# Patient Record
Sex: FEMALE | Race: WHITE | NOT HISPANIC OR LATINO | Employment: OTHER | URBAN - METROPOLITAN AREA
[De-identification: names, ages, dates, MRNs, and addresses within clinical notes are randomized per-mention and may not be internally consistent; named-entity substitution may affect disease eponyms.]

---

## 2017-03-27 ENCOUNTER — HOSPITAL ENCOUNTER (OUTPATIENT)
Dept: MAMMOGRAPHY | Facility: HOSPITAL | Age: 72
Discharge: HOME | End: 2017-03-27
Attending: INTERNAL MEDICINE
Payer: MEDICARE

## 2017-03-27 DIAGNOSIS — Z12.31 ENCOUNTER FOR SCREENING MAMMOGRAM FOR MALIGNANT NEOPLASM OF BREAST: ICD-10-CM

## 2017-03-27 PROCEDURE — G0202 SCR MAMMO BI INCL CAD: HCPCS

## 2017-04-12 ENCOUNTER — APPOINTMENT (OUTPATIENT)
Dept: LAB | Facility: CLINIC | Age: 72
End: 2017-04-12
Payer: MEDICARE

## 2017-04-12 ENCOUNTER — TRANSCRIBE ORDERS (OUTPATIENT)
Dept: LAB | Facility: CLINIC | Age: 72
End: 2017-04-12

## 2017-04-12 DIAGNOSIS — Z79.01 LONG TERM (CURRENT) USE OF ANTICOAGULANTS: ICD-10-CM

## 2017-04-12 DIAGNOSIS — I10 UNSPECIFIED ESSENTIAL HYPERTENSION: ICD-10-CM

## 2017-04-12 DIAGNOSIS — E78.00 PURE HYPERCHOLESTEROLEMIA: ICD-10-CM

## 2017-04-12 DIAGNOSIS — E78.00 PURE HYPERCHOLESTEROLEMIA: Primary | ICD-10-CM

## 2017-04-12 LAB
ALBUMIN SERPL BCP-MCNC: 3.6 G/DL (ref 3.5–5)
ALP SERPL-CCNC: 86 U/L (ref 46–116)
ALT SERPL W P-5'-P-CCNC: 32 U/L (ref 12–78)
ANION GAP SERPL CALCULATED.3IONS-SCNC: 8 MMOL/L (ref 4–13)
AST SERPL W P-5'-P-CCNC: 24 U/L (ref 5–45)
BILIRUB DIRECT SERPL-MCNC: 0.26 MG/DL (ref 0–0.2)
BILIRUB SERPL-MCNC: 1 MG/DL (ref 0.2–1)
BUN SERPL-MCNC: 20 MG/DL (ref 5–25)
CALCIUM SERPL-MCNC: 9 MG/DL (ref 8.3–10.1)
CHLORIDE SERPL-SCNC: 103 MMOL/L (ref 100–108)
CHOLEST SERPL-MCNC: 196 MG/DL (ref 50–200)
CK SERPL-CCNC: 51 U/L (ref 26–192)
CO2 SERPL-SCNC: 31 MMOL/L (ref 21–32)
CREAT SERPL-MCNC: 1.02 MG/DL (ref 0.6–1.3)
ERYTHROCYTE [DISTWIDTH] IN BLOOD BY AUTOMATED COUNT: 12.8 % (ref 11.6–15.1)
GFR SERPL CREATININE-BSD FRML MDRD: 53.1 ML/MIN/1.73SQ M
GLUCOSE P FAST SERPL-MCNC: 96 MG/DL (ref 65–99)
HCT VFR BLD AUTO: 39.3 % (ref 34.8–46.1)
HDLC SERPL-MCNC: 121 MG/DL (ref 40–60)
HGB BLD-MCNC: 12.7 G/DL (ref 11.5–15.4)
LDLC SERPL CALC-MCNC: 68 MG/DL (ref 0–100)
MCH RBC QN AUTO: 30.1 PG (ref 26.8–34.3)
MCHC RBC AUTO-ENTMCNC: 32.3 G/DL (ref 31.4–37.4)
MCV RBC AUTO: 93 FL (ref 82–98)
PLATELET # BLD AUTO: 273 THOUSANDS/UL (ref 149–390)
PMV BLD AUTO: 9.6 FL (ref 8.9–12.7)
POTASSIUM SERPL-SCNC: 4 MMOL/L (ref 3.5–5.3)
PROT SERPL-MCNC: 7.3 G/DL (ref 6.4–8.2)
RBC # BLD AUTO: 4.22 MILLION/UL (ref 3.81–5.12)
SODIUM SERPL-SCNC: 142 MMOL/L (ref 136–145)
TRIGL SERPL-MCNC: 35 MG/DL
WBC # BLD AUTO: 5.91 THOUSAND/UL (ref 4.31–10.16)

## 2017-04-12 PROCEDURE — 85027 COMPLETE CBC AUTOMATED: CPT

## 2017-04-12 PROCEDURE — 36415 COLL VENOUS BLD VENIPUNCTURE: CPT

## 2017-04-12 PROCEDURE — 80048 BASIC METABOLIC PNL TOTAL CA: CPT

## 2017-04-12 PROCEDURE — 82550 ASSAY OF CK (CPK): CPT

## 2017-04-12 PROCEDURE — 80076 HEPATIC FUNCTION PANEL: CPT

## 2017-04-12 PROCEDURE — 80061 LIPID PANEL: CPT

## 2017-06-14 ENCOUNTER — HOSPITAL ENCOUNTER (OUTPATIENT)
Dept: ULTRASOUND IMAGING | Facility: HOSPITAL | Age: 72
Discharge: HOME | End: 2017-06-14
Payer: MEDICARE

## 2017-06-14 DIAGNOSIS — E04.1 NONTOXIC UNINODULAR GOITER: ICD-10-CM

## 2017-06-14 PROCEDURE — 76536 US EXAM OF HEAD AND NECK: CPT

## 2017-10-26 ENCOUNTER — TRANSCRIBE ORDERS (OUTPATIENT)
Dept: LAB | Facility: CLINIC | Age: 72
End: 2017-10-26

## 2017-10-26 ENCOUNTER — APPOINTMENT (OUTPATIENT)
Dept: LAB | Facility: CLINIC | Age: 72
End: 2017-10-26
Payer: MEDICARE

## 2017-10-26 DIAGNOSIS — I10 ESSENTIAL HYPERTENSION, MALIGNANT: ICD-10-CM

## 2017-10-26 DIAGNOSIS — R00.2 PALPITATIONS: ICD-10-CM

## 2017-10-26 DIAGNOSIS — I10 ESSENTIAL HYPERTENSION, MALIGNANT: Primary | ICD-10-CM

## 2017-10-26 LAB
ALBUMIN SERPL BCP-MCNC: 3.5 G/DL (ref 3.5–5)
ALP SERPL-CCNC: 100 U/L (ref 46–116)
ALT SERPL W P-5'-P-CCNC: 26 U/L (ref 12–78)
ANION GAP SERPL CALCULATED.3IONS-SCNC: 7 MMOL/L (ref 4–13)
AST SERPL W P-5'-P-CCNC: 21 U/L (ref 5–45)
BILIRUB DIRECT SERPL-MCNC: 0.19 MG/DL (ref 0–0.2)
BILIRUB SERPL-MCNC: 0.7 MG/DL (ref 0.2–1)
BUN SERPL-MCNC: 20 MG/DL (ref 5–25)
CALCIUM SERPL-MCNC: 9.3 MG/DL (ref 8.3–10.1)
CHLORIDE SERPL-SCNC: 104 MMOL/L (ref 100–108)
CHOLEST SERPL-MCNC: 204 MG/DL (ref 50–200)
CK SERPL-CCNC: 37 U/L (ref 26–192)
CO2 SERPL-SCNC: 32 MMOL/L (ref 21–32)
CREAT SERPL-MCNC: 0.89 MG/DL (ref 0.6–1.3)
GFR SERPL CREATININE-BSD FRML MDRD: 64 ML/MIN/1.73SQ M
GLUCOSE P FAST SERPL-MCNC: 100 MG/DL (ref 65–99)
HDLC SERPL-MCNC: 124 MG/DL (ref 40–60)
LDLC SERPL CALC-MCNC: 72 MG/DL (ref 0–100)
POTASSIUM SERPL-SCNC: 4.3 MMOL/L (ref 3.5–5.3)
PROT SERPL-MCNC: 7.2 G/DL (ref 6.4–8.2)
SODIUM SERPL-SCNC: 143 MMOL/L (ref 136–145)
TRIGL SERPL-MCNC: 40 MG/DL

## 2017-10-26 PROCEDURE — 80048 BASIC METABOLIC PNL TOTAL CA: CPT

## 2017-10-26 PROCEDURE — 80076 HEPATIC FUNCTION PANEL: CPT

## 2017-10-26 PROCEDURE — 36415 COLL VENOUS BLD VENIPUNCTURE: CPT

## 2017-10-26 PROCEDURE — 82550 ASSAY OF CK (CPK): CPT

## 2017-10-26 PROCEDURE — 80061 LIPID PANEL: CPT

## 2017-11-28 ENCOUNTER — GENERIC CONVERSION - ENCOUNTER (OUTPATIENT)
Dept: OTHER | Facility: OTHER | Age: 72
End: 2017-11-28

## 2017-11-28 ENCOUNTER — TRANSCRIBE ORDERS (OUTPATIENT)
Dept: ADMINISTRATIVE | Facility: HOSPITAL | Age: 72
End: 2017-11-28

## 2017-11-28 DIAGNOSIS — Z12.39 SCREENING BREAST EXAMINATION: Primary | ICD-10-CM

## 2017-11-30 ENCOUNTER — ALLSCRIPTS OFFICE VISIT (OUTPATIENT)
Dept: OTHER | Facility: OTHER | Age: 72
End: 2017-11-30

## 2017-12-05 LAB
HPV 18 (HISTORICAL): NOT DETECTED
HPV HIGH RISK 16/18 (HISTORICAL): NOT DETECTED
HPV16 (HISTORICAL): NOT DETECTED
PAP (HISTORICAL): NORMAL

## 2017-12-05 NOTE — PROGRESS NOTES
Assessment    1  Encounter for gynecological examination with Papanicolaou smear of cervix (V72 31)   (Z01 419)    Discussion/Summary  health maintenance visit Currently, she eats a healthy diet  the risks and benefits of cervical cancer screening were discussed cervical cancer screening is current Pap test was done today Breast cancer screening: the risks and benefits of breast cancer screening were discussed and mammogram is current  Colorectal cancer screening: the risks and benefits of colorectal cancer screening were discussed and colorectal cancer screening is current  Osteoporosis screening: the risks and benefits of osteoporosis screening were discussed and bone mineral density testing is current  Normal APE   continue f/u with oncology and PCP  The patient has the current Goals: Routine exam  The patent has the current Barriers: None  Patient is able to Self-Care  The treatment plan was reviewed with the patient/guardian  The patient/guardian understands and agrees with the treatment plan     Self Referrals: No      Chief Complaint  annual Gyn Exam      History of Present Illness  HPI: Here for annual gyn exam - overall well -no vaginal bleeding or discharge - no menopausal complaints - bowels and bladder normal - up to date with colonoscopy, dexascan, blood work and due for mammogram - close f/u with oncology and PCP   GYN , Adult Female [facility]: The patient is being seen for a health maintenance and gynecology evaluation  The last health maintenance visit was 11/12/2015  General Health: The patient's health since the last visit is described as good  She denies vision problems  She denies hearing loss  Immunizations status: up to date  Lifestyle:  She consumes a diverse and healthy diet  She does not have any weight concerns  She exercises regularly  She does not use tobacco  She consumes alcohol  She denies drug use     Reproductive health: the patient is postmenopausal   she reports no menstrual problems  Menstrual history:  age at menarche was 3  LMP: the patient is unsure of the date of her LMP  she uses no contraception  Screening: cancer screening reviewed and current  metabolic screening reviewed and current  risk screening reviewed and current  Review of Systems    Constitutional: No fever, no chills, feels well, no tiredness, no recent weight gain or loss  Cardiovascular: lower extremity edema, but the heart rate was not slow, no chest pain, no intermittent leg claudication, the heart rate was not fast and no palpitations  Respiratory: no complaints of shortness of breath, no wheezing, no dyspnea on exertion, no orthopnea or PND  Breasts: no complaints of breast pain, breast lump or nipple discharge  Gastrointestinal: no complaints of abdominal pain, no constipation, no nausea or diarrhea, no vomiting, no bloody stools  Genitourinary: no complaints of dysuria, no incontinence, no pelvic pain, no dysmenorrhea, no vaginal discharge or abnormal vaginal bleeding  Musculoskeletal: joint pain, but no complaints of arthralgia, no myalgia, no joint swelling or stiffness, no limb pain or swelling  Neurological: no complaints of headache, no confusion, no numbness or tingling, no dizziness or fainting  ROS reviewed  OB History  Pregnancy History (Brief):   Prior pregnancies: : 2  Para: 2 (living)   Delivery type: 2 vaginal       Active Problems    1  Breast cancer screening, high risk patient (6 11) (Z12 31)   2  Encounter for gynecological examination with Papanicolaou smear of cervix ()   (Z01 419)   3  Encounter for screening mammogram for high-risk patient (6 11) (Z12 31)   4  History of malignant neoplasm of female breast (V10 3) (Z85 3)   5  Hypothyroidism (244 9) (E03 9)   6  Subcutaneous nodule (782 2) (R22 9)   7   Visit for screening mammogram ( 12) (Z12 31)    Past Medical History    · History of cardiac disorder (V12 50) (Z86 79)   · History of osteopenia (V13 59) (Z87 39)   · History of Hyperthyroidism (242 90) (E05 90)   · History of Well female exam with routine gynecological exam (V72 31) (Z01 419)    The active problems and past medical history were reviewed and updated today  Surgical History    · History of Catheter Ablation   · History of Donie Lymph Node Biopsy    The surgical history was reviewed and updated today  Family History  Father    · Family history of Congestive Heart Failure  Family History    · Family history of Cancer    The family history was reviewed and updated today  Social History    · Alcohol use (V49 89) (Z78 9)   · Cultural background   · NON-   · Former smoker (L22 80) (O76 060)   ·    · No drug use   · Non-smoker (V49 89) (Z78 9)   · Primary spoken language English   · Racial background   · WHITE   · Sexually active  The social history was reviewed and updated today  Current Meds   1  Cartia  MG Oral Capsule Extended Release 24 Hour; Therapy: 49FVE8063 to (Last Rx:16Jun2011)  Requested for: 16Jun2011 Ordered   2  [employer] Vitamin D 2000 UNIT CAPS; Therapy: (Recorded:04Qfm5903) to Recorded   3  Levothyroxine Sodium 88 MCG Oral Tablet; TAKE 1 TABLET DAILY; Therapy: 67MSK3240 to (Evaluate:16Vht1653)  Requested for: 80YFQ5654; Last   Rx:63Yks3576; Status: ACTIVE - Renewal Denied Ordered   4  Multi-Vitamins TABS; Therapy: (Recorded:04Ydl2371) to Recorded   5  Xarelto 20 MG Oral Tablet; take 1 tab po daily Recorded    Allergies    1  No Known Drug Allergies    Vitals   Recorded: 03HGW3653 52:58EW   Systolic 082, LUE, Sitting   Diastolic 62, LUE, Sitting   Height 5 ft 3 in   Weight 164 lb    BMI Calculated 29 05   BSA Calculated 1 78     Physical Exam    Constitutional   General appearance: No acute distress, well appearing and well nourished  well nourished older white female  Neck   Neck: Normal, supple, trachea midline, no masses  Thyroid: Normal, no thyromegaly  Pulmonary   Respiratory effort: No increased work of breathing or signs of respiratory distress  Auscultation of lungs: Clear to auscultation  Cardiovascular   Auscultation of heart: Normal rate and rhythm, normal S1 and S2, no murmurs  Peripheral vascular exam: Normal pulses Throughout  Genitourinary   External genitalia: Normal and no lesions appreciated  Vagina: Normal, no lesions or dryness appreciated  atrophic  Urethra: Normal     Urethral meatus: Normal     Bladder: Normal, soft, non-tender and no prolapse or masses appreciated  Cervix: Normal, no palpable masses  stenotic  Uterus: Normal, non-tender, not enlarged, and no palpable masses  Adnexa/parametria: Normal, non-tender and no fullness or masses appreciated  Anus, perineum, and rectum: Normal sphincter tone, no masses, and no prolapse  Chest   Breasts: Normal and no dimpling or skin changes noted  Abdomen   Abdomen: Normal, non-tender, and no organomegaly noted  Liver and spleen: No hepatomegaly or splenomegaly  Examination for hernias: No hernias appreciated  Stool sample for occult blood: Negative  Lymphatic   Palpation of lymph nodes in neck, axillae, groin and/or other locations: No lymphadenopathy or masses noted  Skin   Skin and subcutaneous tissue: Normal skin turgor and no rashes  Palpation of skin and subcutaneous tissue: Normal     Psychiatric   Orientation to person, place, and time: Normal     Mood and affect: Normal        Future Appointments    Date/Time Provider Specialty Site   12/04/2018 01:00 PM SALEEM Fang   Hematology Oncology [facility] [place]     Signatures   Electronically signed by : SALEEM Hawthorne ; Nov 30 2017 12:04PM EST                       (Author)

## 2018-01-03 ENCOUNTER — APPOINTMENT (OUTPATIENT)
Dept: LAB | Facility: CLINIC | Age: 73
End: 2018-01-03
Payer: MEDICARE

## 2018-01-03 ENCOUNTER — TRANSCRIBE ORDERS (OUTPATIENT)
Dept: LAB | Facility: CLINIC | Age: 73
End: 2018-01-03

## 2018-01-03 DIAGNOSIS — I51.9 MYXEDEMA HEART DISEASE: ICD-10-CM

## 2018-01-03 DIAGNOSIS — E03.9 MYXEDEMA HEART DISEASE: ICD-10-CM

## 2018-01-03 DIAGNOSIS — I51.9 MYXEDEMA HEART DISEASE: Primary | ICD-10-CM

## 2018-01-03 DIAGNOSIS — E03.9 MYXEDEMA HEART DISEASE: Primary | ICD-10-CM

## 2018-01-03 LAB
T4 FREE SERPL-MCNC: 1.29 NG/DL (ref 0.76–1.46)
TSH SERPL DL<=0.05 MIU/L-ACNC: 1.19 UIU/ML (ref 0.36–3.74)

## 2018-01-03 PROCEDURE — 36415 COLL VENOUS BLD VENIPUNCTURE: CPT

## 2018-01-03 PROCEDURE — 84439 ASSAY OF FREE THYROXINE: CPT

## 2018-01-03 PROCEDURE — 84443 ASSAY THYROID STIM HORMONE: CPT

## 2018-01-15 VITALS
BODY MASS INDEX: 29.06 KG/M2 | HEIGHT: 63 IN | SYSTOLIC BLOOD PRESSURE: 108 MMHG | DIASTOLIC BLOOD PRESSURE: 62 MMHG | WEIGHT: 164 LBS

## 2018-01-22 VITALS
DIASTOLIC BLOOD PRESSURE: 60 MMHG | WEIGHT: 165 LBS | SYSTOLIC BLOOD PRESSURE: 90 MMHG | BODY MASS INDEX: 29.23 KG/M2 | RESPIRATION RATE: 14 BRPM | TEMPERATURE: 99.1 F | OXYGEN SATURATION: 95 % | HEIGHT: 63 IN | HEART RATE: 110 BPM

## 2018-02-14 ENCOUNTER — APPOINTMENT (OUTPATIENT)
Dept: LAB | Facility: CLINIC | Age: 73
End: 2018-02-14
Payer: MEDICARE

## 2018-02-14 ENCOUNTER — APPOINTMENT (INPATIENT)
Dept: RADIOLOGY | Facility: HOSPITAL | Age: 73
End: 2018-02-14
Payer: MEDICARE

## 2018-02-14 ENCOUNTER — HOSPITAL ENCOUNTER (OUTPATIENT)
Facility: HOSPITAL | Age: 73
Setting detail: OUTPATIENT SURGERY
LOS: 1 days | Discharge: HOME | End: 2018-02-16
Attending: SURGERY | Admitting: SURGERY
Payer: MEDICARE

## 2018-02-14 ENCOUNTER — HOSPITAL ENCOUNTER (OUTPATIENT)
Dept: CT IMAGING | Facility: HOSPITAL | Age: 73
Discharge: HOME | End: 2018-02-14
Payer: MEDICARE

## 2018-02-14 ENCOUNTER — ANESTHESIA EVENT (INPATIENT)
Dept: PERIOP | Facility: HOSPITAL | Age: 73
End: 2018-02-14
Payer: MEDICARE

## 2018-02-14 ENCOUNTER — ANESTHESIA (INPATIENT)
Dept: PERIOP | Facility: HOSPITAL | Age: 73
End: 2018-02-14
Payer: MEDICARE

## 2018-02-14 ENCOUNTER — TRANSCRIBE ORDERS (OUTPATIENT)
Dept: ADMINISTRATIVE | Facility: HOSPITAL | Age: 73
End: 2018-02-14

## 2018-02-14 DIAGNOSIS — K35.890 OTHER ACUTE APPENDICITIS: Primary | ICD-10-CM

## 2018-02-14 DIAGNOSIS — R10.9 STOMACH ACHE: Primary | ICD-10-CM

## 2018-02-14 DIAGNOSIS — Z00.00 ROUTINE GENERAL MEDICAL EXAMINATION AT A HEALTH CARE FACILITY: ICD-10-CM

## 2018-02-14 DIAGNOSIS — R10.9 STOMACH ACHE: ICD-10-CM

## 2018-02-14 PROBLEM — I48.91 ATRIAL FIBRILLATION (HCC): Status: ACTIVE | Noted: 2018-02-14

## 2018-02-14 PROBLEM — K37 APPENDICITIS: Status: ACTIVE | Noted: 2018-02-14

## 2018-02-14 PROBLEM — E03.9 HYPOTHYROID: Status: ACTIVE | Noted: 2018-02-14

## 2018-02-14 LAB
ANION GAP SERPL CALCULATED.3IONS-SCNC: 11 MMOL/L (ref 4–13)
BUN SERPL-MCNC: 18 MG/DL (ref 5–25)
BUN SERPL-MCNC: 20 MG/DL (ref 5–25)
CALCIUM SERPL-MCNC: 9.6 MG/DL (ref 8.3–10.1)
CHLORIDE SERPL-SCNC: 100 MMOL/L (ref 100–108)
CO2 SERPL-SCNC: 27 MMOL/L (ref 21–32)
CREAT SERPL-MCNC: 0.92 MG/DL (ref 0.6–1.3)
CREAT SERPL-MCNC: 0.94 MG/DL (ref 0.6–1.3)
ERYTHROCYTE [DISTWIDTH] IN BLOOD BY AUTOMATED COUNT: 12.5 % (ref 11.6–15.1)
GFR SERPL CREATININE-BSD FRML MDRD: 60 ML/MIN/1.73SQ M
GFR SERPL CREATININE-BSD FRML MDRD: 62 ML/MIN/1.73SQ M
GLUCOSE SERPL-MCNC: 98 MG/DL (ref 65–140)
HCT VFR BLD AUTO: 40 % (ref 34.8–46.1)
HGB BLD-MCNC: 13.2 G/DL (ref 11.5–15.4)
MCH RBC QN AUTO: 29.7 PG (ref 26.8–34.3)
MCHC RBC AUTO-ENTMCNC: 33 G/DL (ref 31.4–37.4)
MCV RBC AUTO: 90 FL (ref 82–98)
PLATELET # BLD AUTO: 274 THOUSANDS/UL (ref 149–390)
PMV BLD AUTO: 9.4 FL (ref 8.9–12.7)
POTASSIUM SERPL-SCNC: 4.1 MMOL/L (ref 3.5–5.3)
RBC # BLD AUTO: 4.44 MILLION/UL (ref 3.81–5.12)
SODIUM SERPL-SCNC: 138 MMOL/L (ref 136–145)
WBC # BLD AUTO: 8.44 THOUSAND/UL (ref 4.31–10.16)

## 2018-02-14 PROCEDURE — 82565 ASSAY OF CREATININE: CPT

## 2018-02-14 PROCEDURE — 85027 COMPLETE CBC AUTOMATED: CPT | Performed by: PHYSICIAN ASSISTANT

## 2018-02-14 PROCEDURE — 88304 TISSUE EXAM BY PATHOLOGIST: CPT | Performed by: PATHOLOGY

## 2018-02-14 PROCEDURE — 74177 CT ABD & PELVIS W/CONTRAST: CPT

## 2018-02-14 PROCEDURE — 93005 ELECTROCARDIOGRAM TRACING: CPT | Performed by: PHYSICIAN ASSISTANT

## 2018-02-14 PROCEDURE — 84520 ASSAY OF UREA NITROGEN: CPT

## 2018-02-14 PROCEDURE — 80048 BASIC METABOLIC PNL TOTAL CA: CPT | Performed by: PHYSICIAN ASSISTANT

## 2018-02-14 PROCEDURE — 71045 X-RAY EXAM CHEST 1 VIEW: CPT

## 2018-02-14 PROCEDURE — 88304 TISSUE EXAM BY PATHOLOGIST: CPT | Performed by: SURGERY

## 2018-02-14 PROCEDURE — 36415 COLL VENOUS BLD VENIPUNCTURE: CPT

## 2018-02-14 RX ORDER — ROCURONIUM BROMIDE 10 MG/ML
INJECTION, SOLUTION INTRAVENOUS AS NEEDED
Status: DISCONTINUED | OUTPATIENT
Start: 2018-02-14 | End: 2018-02-14 | Stop reason: SURG

## 2018-02-14 RX ORDER — SODIUM CHLORIDE, SODIUM LACTATE, POTASSIUM CHLORIDE, CALCIUM CHLORIDE 600; 310; 30; 20 MG/100ML; MG/100ML; MG/100ML; MG/100ML
84 INJECTION, SOLUTION INTRAVENOUS CONTINUOUS
Status: DISCONTINUED | OUTPATIENT
Start: 2018-02-14 | End: 2018-02-15

## 2018-02-14 RX ORDER — OXYCODONE HYDROCHLORIDE AND ACETAMINOPHEN 5; 325 MG/1; MG/1
2 TABLET ORAL EVERY 4 HOURS PRN
Status: DISCONTINUED | OUTPATIENT
Start: 2018-02-14 | End: 2018-02-16 | Stop reason: HOSPADM

## 2018-02-14 RX ORDER — PROPOFOL 10 MG/ML
INJECTION, EMULSION INTRAVENOUS AS NEEDED
Status: DISCONTINUED | OUTPATIENT
Start: 2018-02-14 | End: 2018-02-14 | Stop reason: SURG

## 2018-02-14 RX ORDER — ONDANSETRON 2 MG/ML
4 INJECTION INTRAMUSCULAR; INTRAVENOUS ONCE AS NEEDED
Status: COMPLETED | OUTPATIENT
Start: 2018-02-14 | End: 2018-02-14

## 2018-02-14 RX ORDER — LIDOCAINE HYDROCHLORIDE 10 MG/ML
INJECTION, SOLUTION INFILTRATION; PERINEURAL AS NEEDED
Status: DISCONTINUED | OUTPATIENT
Start: 2018-02-14 | End: 2018-02-14 | Stop reason: SURG

## 2018-02-14 RX ORDER — MIDAZOLAM HYDROCHLORIDE 1 MG/ML
INJECTION INTRAMUSCULAR; INTRAVENOUS AS NEEDED
Status: DISCONTINUED | OUTPATIENT
Start: 2018-02-14 | End: 2018-02-14 | Stop reason: SURG

## 2018-02-14 RX ORDER — MEPERIDINE HYDROCHLORIDE 25 MG/ML
25 INJECTION INTRAMUSCULAR; INTRAVENOUS; SUBCUTANEOUS ONCE
Status: COMPLETED | OUTPATIENT
Start: 2018-02-14 | End: 2018-02-14

## 2018-02-14 RX ORDER — ONDANSETRON 2 MG/ML
4 INJECTION INTRAMUSCULAR; INTRAVENOUS EVERY 6 HOURS PRN
Status: DISCONTINUED | OUTPATIENT
Start: 2018-02-14 | End: 2018-02-14 | Stop reason: HOSPADM

## 2018-02-14 RX ORDER — LEVOTHYROXINE SODIUM 0.1 MG/1
88 TABLET ORAL DAILY
COMMUNITY

## 2018-02-14 RX ORDER — GLYCOPYRROLATE 0.2 MG/ML
INJECTION INTRAMUSCULAR; INTRAVENOUS AS NEEDED
Status: DISCONTINUED | OUTPATIENT
Start: 2018-02-14 | End: 2018-02-14 | Stop reason: SURG

## 2018-02-14 RX ORDER — FENTANYL CITRATE/PF 50 MCG/ML
25 SYRINGE (ML) INJECTION
Status: DISCONTINUED | OUTPATIENT
Start: 2018-02-14 | End: 2018-02-14 | Stop reason: HOSPADM

## 2018-02-14 RX ORDER — ONDANSETRON 2 MG/ML
INJECTION INTRAMUSCULAR; INTRAVENOUS AS NEEDED
Status: DISCONTINUED | OUTPATIENT
Start: 2018-02-14 | End: 2018-02-14 | Stop reason: SURG

## 2018-02-14 RX ORDER — MAGNESIUM HYDROXIDE 1200 MG/15ML
LIQUID ORAL AS NEEDED
Status: DISCONTINUED | OUTPATIENT
Start: 2018-02-14 | End: 2018-02-14 | Stop reason: HOSPADM

## 2018-02-14 RX ORDER — SUCCINYLCHOLINE CHLORIDE 20 MG/ML
INJECTION INTRAMUSCULAR; INTRAVENOUS AS NEEDED
Status: DISCONTINUED | OUTPATIENT
Start: 2018-02-14 | End: 2018-02-14 | Stop reason: SURG

## 2018-02-14 RX ORDER — DILTIAZEM HYDROCHLORIDE 180 MG/1
180 CAPSULE, COATED, EXTENDED RELEASE ORAL DAILY
Status: DISCONTINUED | OUTPATIENT
Start: 2018-02-15 | End: 2018-02-16 | Stop reason: HOSPADM

## 2018-02-14 RX ORDER — FENTANYL CITRATE 50 UG/ML
INJECTION, SOLUTION INTRAMUSCULAR; INTRAVENOUS AS NEEDED
Status: DISCONTINUED | OUTPATIENT
Start: 2018-02-14 | End: 2018-02-14 | Stop reason: SURG

## 2018-02-14 RX ORDER — LEVOTHYROXINE SODIUM 88 UG/1
88 TABLET ORAL
Status: DISCONTINUED | OUTPATIENT
Start: 2018-02-15 | End: 2018-02-16 | Stop reason: HOSPADM

## 2018-02-14 RX ORDER — OXYCODONE HYDROCHLORIDE AND ACETAMINOPHEN 5; 325 MG/1; MG/1
1 TABLET ORAL EVERY 4 HOURS PRN
Status: DISCONTINUED | OUTPATIENT
Start: 2018-02-14 | End: 2018-02-16 | Stop reason: HOSPADM

## 2018-02-14 RX ORDER — DILTIAZEM HCL 90 MG
180 TABLET ORAL DAILY
COMMUNITY
End: 2022-02-09

## 2018-02-14 RX ADMIN — DEXAMETHASONE SODIUM PHOSPHATE 5 MG: 10 INJECTION INTRAMUSCULAR; INTRAVENOUS at 18:29

## 2018-02-14 RX ADMIN — MEPERIDINE HYDROCHLORIDE 25 MG: 25 INJECTION, SOLUTION INTRAMUSCULAR; INTRAVENOUS; SUBCUTANEOUS at 20:24

## 2018-02-14 RX ADMIN — NEOSTIGMINE METHYLSULFATE 2 MG: 1 INJECTION, SOLUTION INTRAMUSCULAR; INTRAVENOUS; SUBCUTANEOUS at 19:41

## 2018-02-14 RX ADMIN — LIDOCAINE HYDROCHLORIDE 75 MG: 10 INJECTION, SOLUTION INFILTRATION; PERINEURAL at 18:23

## 2018-02-14 RX ADMIN — ONDANSETRON 4 MG: 2 INJECTION INTRAMUSCULAR; INTRAVENOUS at 20:25

## 2018-02-14 RX ADMIN — FENTANYL CITRATE 50 MCG: 50 INJECTION INTRAMUSCULAR; INTRAVENOUS at 18:23

## 2018-02-14 RX ADMIN — GLYCOPYRROLATE 0.4 MG: 0.2 INJECTION, SOLUTION INTRAMUSCULAR; INTRAVENOUS at 19:41

## 2018-02-14 RX ADMIN — ROCURONIUM BROMIDE 15 MG: 10 INJECTION INTRAVENOUS at 18:32

## 2018-02-14 RX ADMIN — PROPOFOL 150 MG: 10 INJECTION, EMULSION INTRAVENOUS at 18:23

## 2018-02-14 RX ADMIN — IOHEXOL 50 ML: 240 INJECTION, SOLUTION INTRATHECAL; INTRAVASCULAR; INTRAVENOUS; ORAL at 14:00

## 2018-02-14 RX ADMIN — CEFAZOLIN SODIUM 1000 MG: 1 SOLUTION INTRAVENOUS at 17:13

## 2018-02-14 RX ADMIN — CEFAZOLIN SODIUM 1000 MG: 1 SOLUTION INTRAVENOUS at 18:35

## 2018-02-14 RX ADMIN — OXYCODONE HYDROCHLORIDE AND ACETAMINOPHEN 1 TABLET: 5; 325 TABLET ORAL at 21:22

## 2018-02-14 RX ADMIN — FENTANYL CITRATE 50 MCG: 50 INJECTION INTRAMUSCULAR; INTRAVENOUS at 18:42

## 2018-02-14 RX ADMIN — SODIUM CHLORIDE, SODIUM LACTATE, POTASSIUM CHLORIDE, AND CALCIUM CHLORIDE: .6; .31; .03; .02 INJECTION, SOLUTION INTRAVENOUS at 18:19

## 2018-02-14 RX ADMIN — FENTANYL CITRATE 50 MCG: 50 INJECTION INTRAMUSCULAR; INTRAVENOUS at 19:00

## 2018-02-14 RX ADMIN — METRONIDAZOLE 500 MG: 500 INJECTION, SOLUTION INTRAVENOUS at 18:30

## 2018-02-14 RX ADMIN — ONDANSETRON 4 MG: 2 INJECTION INTRAMUSCULAR; INTRAVENOUS at 18:29

## 2018-02-14 RX ADMIN — SODIUM CHLORIDE, SODIUM LACTATE, POTASSIUM CHLORIDE, AND CALCIUM CHLORIDE 125 ML/HR: .6; .31; .03; .02 INJECTION, SOLUTION INTRAVENOUS at 17:06

## 2018-02-14 RX ADMIN — FENTANYL CITRATE 50 MCG: 50 INJECTION INTRAMUSCULAR; INTRAVENOUS at 19:32

## 2018-02-14 RX ADMIN — SUCCINYLCHOLINE CHLORIDE 100 MG: 20 INJECTION, SOLUTION INTRAMUSCULAR; INTRAVENOUS at 18:23

## 2018-02-14 RX ADMIN — IOHEXOL 100 ML: 350 INJECTION, SOLUTION INTRAVENOUS at 14:13

## 2018-02-14 RX ADMIN — MIDAZOLAM HYDROCHLORIDE 2 MG: 1 INJECTION, SOLUTION INTRAMUSCULAR; INTRAVENOUS at 18:15

## 2018-02-14 NOTE — ANESTHESIA PREPROCEDURE EVALUATION
5 Day Hx  Of Abdominal Pain  No Nausea or Vomiting  NPO past 1130   Review of Systems/Medical History          Cardiovascular  Dysrhythmias, atrial fibrillation,   Comment: On Xarelto, Cardizem,  Pulmonary  Smoker ex-smoker  ,        GI/Hepatic            Endo/Other  History of thyroid disease , hypothyroidism,      GYN       Hematology   Musculoskeletal       Neurology   Psychology           Lab Results   Component Value Date    GLUCOSE 98 02/14/2018    ALT 26 10/26/2017    AST 21 10/26/2017    BUN 18 02/14/2018    CALCIUM 9 6 02/14/2018     02/14/2018    CHOL 204 (H) 10/26/2017    CO2 27 02/14/2018    CREATININE 0 92 02/14/2018     (H) 10/26/2017    HCT 40 0 02/14/2018    HGB 13 2 02/14/2018    PROT 7 2 10/26/2017    PHOS 4 1 10/21/2014     02/14/2018    K 4 1 02/14/2018     02/14/2018    TRIG 40 10/26/2017    WBC 8 44 02/14/2018       Physical Exam    Airway    Mallampati score: I  TM Distance: >3 FB  Neck ROM: full     Dental   upper dentures,     Cardiovascular      Pulmonary      Other Findings        Anesthesia Plan  ASA Score- 3 Emergent    Anesthesia Type- general with ASA Monitors  Additional Monitors:   Airway Plan: ETT  Plan Factors-Patient not instructed to abstain from smoking on day of procedure  Patient did not smoke on day of surgery  Induction- intravenous  Postoperative Plan-     Informed Consent- Anesthetic plan and risks discussed with patient  I personally reviewed this patient with the CRNA  Discussed and agreed on the Anesthesia Plan with the CRNA  Nuzhat Vega

## 2018-02-14 NOTE — INCIDENTAL FINDINGS
The following findings require follow up:  Radiographic finding   Findin 5 mm suspected cyst in the distal pancreatic body  Recommend characterization and establishment of baseline now in the nonemergent setting  Preferred modality is Abdomen MRI with and without IV contrast/MRCP      Please notify the following clinician to assist with the follow up:    Your primary care provider

## 2018-02-14 NOTE — H&P
History and Physical -General Surgery  Sheng Mckee 74 y o  female MRN: [id number]  Unit/Bed#: -01 Encounter: [id number]        Reason for Consult / Principal Problem: Appendicitis    HPI: Sheng Mckee is a 74y o  year old female with history of A fib, hypothyroid, R breast cancer who presented as a direct admission after she had a cat scan earlier today showing she had acute appendicitis  She developed diffuse abdominal pain 5 days ago and 3 days ago it localized to the RLQ  It kept her from sleeping only the first night  Since then it only hurts when she touches this area, and rates it a 10/10 when she touches the area  She denies nausea, vomiting, fever, chills or  symptoms  Last BM today and normal   Last PO intake 1130 this morning  Review of Systems   Constitutional: Negative for chills and fever  Respiratory: Negative for chest tightness and shortness of breath  Cardiovascular: Positive for leg swelling  Negative for chest pain  Gastrointestinal: Positive for abdominal pain  Negative for abdominal distention, constipation, diarrhea and nausea  Genitourinary: Negative for difficulty urinating and urgency  Skin: Negative for color change and pallor  Historical Information   Past Medical History:   Diagnosis Date    [facility]     [facility] ([facility] ) 2010    Right    Hypothyroid      Past Surgical History:   Procedure Laterality Date    BREAST LUMPECTOMY Right 2010    MOUTH SURGERY      PILONIDAL CYST EXCISION       Social History   History   Alcohol Use    4 2 oz/week    7 Glasses of wine per week     History   Drug Use No     History   Smoking Status    Former Smoker    Packs/day: 1 00    Years: 40 00    Quit date: 2/14/2005   Smokeless Tobacco    Not on file     No family history on file      Meds/Allergies     Home medications:   Xarelto  Synthroid  Cardizem    Current Facility-Administered Medications   Medication Dose Route Frequency    ceFAZolin (ANCEF) IVPB (premix) 1,000 mg  1,000 mg Intravenous Q8H    HYDROmorphone (DILAUDID) injection 1 mg  1 mg Intravenous Q4H PRN    lactated ringers infusion  125 mL/hr Intravenous Continuous    metroNIDAZOLE (FLAGYL) IVPB (premix) 500 mg  500 mg Intravenous Q8H    ondansetron (ZOFRAN) injection 4 mg  4 mg Intravenous Q6H PRN       No Known Allergies    Objective     Blood pressure 136/72, pulse 95, temperature 98 6 °F (37 °C), temperature source Oral, resp  rate 18, SpO2 97 %  No intake or output data in the 24 hours ending 02/14/18 1646    PHYSICAL EXAM  General appearance: alert and oriented, in no acute distress  Skin: Skin color, texture, turgor normal  No rashes or lesions  Head: Normocephalic, without obvious abnormality  Heart: regular rate and rhythm, S1, S2 normal, no murmur, click, rub or gallop  Lungs: clear to auscultation bilaterally  Abdomen: flat and soft, tender RLQ, no rebound or guarding  +BS, no rebound or guarding  Back: negative  Neurological: normal without focal findings    Lab Results:   Appointment on 02/14/2018   Component Date Value    BUN 02/14/2018 20     Creatinine 02/14/2018 0 94     eGFR 02/14/2018 60      Imaging Studies: I have personally reviewed pertinent reports  ASSESSMENT/PLAN:  Acute appendicitis - cat scan findings and exam consistent with appendicitis  Labs are pending  -NPO  -IVF  -IV abx  -EKG  -To OR this afternoon with Dr Sherryle Abu fibrillation- hold Xarelto  Hypothyroid - restart meds when PO  Pancreatic lesion - follow up with PCP for nonemergent MRI/MRCP    This patient will require < 2 night hospital stay  Counseling / Coordination of Care  Total time spent today  20 minutes  Greater than 50% of total time was spent with the patient and / or family counseling and / or coordination of care

## 2018-02-15 LAB
ATRIAL RATE: 340 BPM
BASOPHILS # BLD AUTO: 0 THOUSANDS/ΜL (ref 0–0.1)
BASOPHILS NFR BLD AUTO: 0 % (ref 0–1)
EOSINOPHIL # BLD AUTO: 0 THOUSAND/ΜL (ref 0–0.61)
EOSINOPHIL NFR BLD AUTO: 0 % (ref 0–6)
ERYTHROCYTE [DISTWIDTH] IN BLOOD BY AUTOMATED COUNT: 12.4 % (ref 11.6–15.1)
HCT VFR BLD AUTO: 33.1 % (ref 34.8–46.1)
HGB BLD-MCNC: 11 G/DL (ref 11.5–15.4)
LYMPHOCYTES # BLD AUTO: 0.86 THOUSANDS/ΜL (ref 0.6–4.47)
LYMPHOCYTES NFR BLD AUTO: 13 % (ref 14–44)
MCH RBC QN AUTO: 30.4 PG (ref 26.8–34.3)
MCHC RBC AUTO-ENTMCNC: 33.2 G/DL (ref 31.4–37.4)
MCV RBC AUTO: 91 FL (ref 82–98)
MONOCYTES # BLD AUTO: 0.42 THOUSAND/ΜL (ref 0.17–1.22)
MONOCYTES NFR BLD AUTO: 6 % (ref 4–12)
NEUTROPHILS # BLD AUTO: 5.29 THOUSANDS/ΜL (ref 1.85–7.62)
NEUTS SEG NFR BLD AUTO: 81 % (ref 43–75)
PLATELET # BLD AUTO: 230 THOUSANDS/UL (ref 149–390)
PMV BLD AUTO: 9.6 FL (ref 8.9–12.7)
QRS AXIS: 99 DEGREES
QRSD INTERVAL: 74 MS
QT INTERVAL: 360 MS
QTC INTERVAL: 420 MS
RBC # BLD AUTO: 3.62 MILLION/UL (ref 3.81–5.12)
T WAVE AXIS: 74 DEGREES
VENTRICULAR RATE: 82 BPM
WBC # BLD AUTO: 6.57 THOUSAND/UL (ref 4.31–10.16)

## 2018-02-15 PROCEDURE — 93010 ELECTROCARDIOGRAM REPORT: CPT | Performed by: INTERNAL MEDICINE

## 2018-02-15 PROCEDURE — 85025 COMPLETE CBC W/AUTO DIFF WBC: CPT | Performed by: SURGERY

## 2018-02-15 RX ADMIN — SODIUM CHLORIDE, SODIUM LACTATE, POTASSIUM CHLORIDE, AND CALCIUM CHLORIDE 84 ML/HR: .6; .31; .03; .02 INJECTION, SOLUTION INTRAVENOUS at 05:45

## 2018-02-15 RX ADMIN — CEFAZOLIN SODIUM 1000 MG: 1 SOLUTION INTRAVENOUS at 10:07

## 2018-02-15 RX ADMIN — CEFAZOLIN SODIUM 1000 MG: 1 SOLUTION INTRAVENOUS at 02:04

## 2018-02-15 RX ADMIN — CEFAZOLIN SODIUM 1000 MG: 1 SOLUTION INTRAVENOUS at 18:35

## 2018-02-15 RX ADMIN — METRONIDAZOLE 500 MG: 500 INJECTION, SOLUTION INTRAVENOUS at 17:47

## 2018-02-15 RX ADMIN — LEVOTHYROXINE SODIUM 88 MCG: 88 TABLET ORAL at 05:45

## 2018-02-15 RX ADMIN — OXYCODONE HYDROCHLORIDE AND ACETAMINOPHEN 1 TABLET: 5; 325 TABLET ORAL at 10:06

## 2018-02-15 RX ADMIN — METRONIDAZOLE 500 MG: 500 INJECTION, SOLUTION INTRAVENOUS at 01:11

## 2018-02-15 RX ADMIN — METRONIDAZOLE 500 MG: 500 INJECTION, SOLUTION INTRAVENOUS at 09:10

## 2018-02-15 NOTE — ANESTHESIA POSTPROCEDURE EVALUATION
Post-Op Assessment Note      CV Status:  Stable    Mental Status:  Somnolent and awake    Hydration Status:  Stable    PONV Controlled:  None    Airway Patency:  Patent  Airway: intubated    Post Op Vitals Reviewed: Yes          Staff: AnesthesiologistBARBARA           /78 (02/14/18 2001)    Temp 97 5 °F (36 4 °C) (02/14/18 2001)    Pulse 81 (02/14/18 2001)   Resp 15 (02/14/18 2001)    SpO2 100 % (02/14/18 2001)

## 2018-02-15 NOTE — POST OP PROGRESS NOTES
Progress Note -Surgery REMIGIO Crenshaw Bachelor 74 y o  female MRN: [id number]  Unit/Bed#: -01 Encounter: [id number]      Assessment:  74year old female s/p Procedure(s):  APPENDECTOMY LAPAROSCOPIC  Plan:  1  Continue diet as tolerated  2  Pain control  3  Monitor ELISA output  4  Recheck labs in AM   5  DC 2/16        Subjective/Objective     Subjective: Patient states she feels well  Tolerating diet  Pain in controlled  ELISA with 155cc output yesterday and about 70cc today so far  Hemoglobin dropped from 13 to 11  Objective:     /62 (BP Location: Left arm)   Pulse 72   Temp 98 5 °F (36 9 °C) (Oral)   Resp 18   SpO2 95%   I/O last 24 hours: In: 2320 [P O :120; I V :1300]  Out: 910 [Urine:700; Drains:185; Blood:25]        Intake/Output Summary (Last 24 hours) at 02/15/18 1347  Last data filed at 02/15/18 1254   Gross per 24 hour   Intake             1420 ml   Output              910 ml   Net              510 ml       Invasive Devices     Peripheral Intravenous Line            Peripheral IV 02/14/18 Left Forearm less than 1 day          Drain            Closed/Suction Drain RUQ Bulb 15 Fr  less than 1 day                Physical Exam:  /62 (BP Location: Left arm)   Pulse 72   Temp 98 5 °F (36 9 °C) (Oral)   Resp 18   SpO2 95%   General appearance: alert and oriented, in no acute distress and alert  Lungs: clear to auscultation bilaterally  Heart: regular rate and rhythm, S1, S2 normal, no murmur, click, rub or gallop  Abdomen: soft, non-tender; bowel sounds normal; no masses,  no organomegaly incision healing well   ELISA      Current Facility-Administered Medications:     ceFAZolin (ANCEF) IVPB (premix) 1,000 mg, 1,000 mg, Intravenous, Q8H, Margarita Ryo PA-C, Last Rate: 100 mL/hr at 02/15/18 1007, 1,000 mg at 02/15/18 1007    diltiazem (CARDIZEM CD) 24 hr capsule 180 mg, 180 mg, Oral, Daily, Colette Rivera MD    HYDROmorphone (DILAUDID) injection 1 mg, 1 mg, Intravenous, Q4H PRN, Momo Marrero SERGEI Vásquez    lactated ringers infusion, 84 mL/hr, Intravenous, Continuous, Liz Cortez MD, Last Rate: 84 mL/hr at 02/15/18 0545, 84 mL/hr at 02/15/18 0545    levothyroxine tablet 88 mcg, 88 mcg, Oral, Early Morning, Liz Cortez MD, 88 mcg at 02/15/18 0545    metroNIDAZOLE (FLAGYL) IVPB (premix) 500 mg, 500 mg, Intravenous, Q8H, Trenton Holland PA-C, Last Rate: 200 mL/hr at 02/15/18 0910, 500 mg at 02/15/18 0910    oxyCODONE-acetaminophen (PERCOCET) 5-325 mg per tablet 1 tablet, 1 tablet, Oral, Q4H PRN, Liz Cortez MD, 1 tablet at 02/15/18 1006    oxyCODONE-acetaminophen (PERCOCET) 5-325 mg per tablet 2 tablet, 2 tablet, Oral, Q4H PRN, Liz Cortez MD           Lab, Imaging and other studies:  CBC:   Lab Results   Component Value Date    WBC 6 57 02/15/2018    HGB 11 0 (L) 02/15/2018    HCT 33 1 (L) 02/15/2018    MCV 91 02/15/2018     02/15/2018    MCH 30 4 02/15/2018    MCHC 33 2 02/15/2018    RDW 12 4 02/15/2018    MPV 9 6 02/15/2018   , CMP:   Lab Results   Component Value Date     02/14/2018    K 4 1 02/14/2018     02/14/2018    CO2 27 02/14/2018    ANIONGAP 11 02/14/2018    BUN 18 02/14/2018    CREATININE 0 92 02/14/2018    GLUCOSE 98 02/14/2018    CALCIUM 9 6 02/14/2018    EGFR 62 02/14/2018         VTE Pharmacologic Prophylaxis: Sequential compression device (Venodyne)   VTE Mechanical Prophylaxis: sequential compression device    Rounds performed with nursing

## 2018-02-15 NOTE — OP NOTE
OPERATIVE REPORT  PATIENT NAME: Jennifer Marroquin    :  1945  MRN: [id number]  Pt Location: AN OR ROOM 02    SURGERY DATE: 2018    Surgeon(s) and Role:     * BEST Hernandes MD - Primary     * Miguel Huntley MD - Assisting    Preop Diagnosis:  * No pre-op diagnosis entered *    * No Diagnosis Codes entered *    Procedure(s) (LRB):  APPENDECTOMY LAPAROSCOPIC (N/A)    Specimen(s):  ID Type Source Tests Collected by Time Destination   1 :  Tissue Appendix TISSUE EXAM BEST Hernandes MD 2018 1843        Estimated Blood Loss:   Minimal    Drains:   19 round ELISA    Anesthesia Type:   * No anesthesia type entered *    Operative Indications:  49-year-old female presented with right lower quadrant abdominal pain and CT scan concerning for acute appendicitis      Operative Findings:  retroceral inflamed appendicitis with adherance to cecal sidewall with elevation of cecum due to adhesions in the right lower quadrant    Complications:   None    Procedure and Technique:  The patient was identified in the holding area using armband and consent  The patient was taken to the operating room  The patient underwent general routine endotracheal anesthesia  The patient was prepped and draped in the sterile routine fashion  A preoperative pause was therefore performed to identify patient and procedure  Then using an 11 blade  A 5 mm curvilinear incision was made at the infra umbilicus  A Veress needle was used to gain access into the abdomen  Then CO2 insufflation was safely achieved  Then a 5 mm port was placed into the infraumbilical position  The patient was therefore placed head down right side up  It was noted that the patient had significant adhesions in the right lower quadrant  Two other ports were placed 12 mm port at the suprapubic, another 5 mm port in the right upper quadrant under direct visualization  These adhesions were taken down with a combination of blunt grasper as well as Harmonic scalpel  The appendix was identified  It was noted to be retrocecal and was adhesed to the cecal side wall  The appendix was grasped using laparoscopic Allis  The appendix was dissected off this cecal side wall using combination of Harmonic scalpel  The mesoappendix was transected with the Harmonic scalpel  The base of the appendix was identified and the appendix was transected with with the laparoscopic blue load stapler  There was some bleeding at the distal edge of the staple line  This was reinforced with laparoscopic clips  Irrigation was achieved  Hemostasis was achieved  The appendix was placed in the Endo-Catch bag  And removed from the abdomen  All ports were removed from the abdomen under direct visualization  The 12 mm fascia at the suprapubic was closed with an 0 Vicryl figure-of-eight suture  All skin port sites were closed 4 Monocryl and histocryl  1% lidocaine without epi was infiltrated at all port sites  The patient was extubated and transferred to the PACU in stable conditions  Dr Jelly Marroquin was present throughout the entire portion of the case      Patient Disposition:  PACU     SIGNATURE: Monica Hightower MD  DATE: February 14, 2018  TIME: 8:01 PM

## 2018-02-16 VITALS
TEMPERATURE: 97.6 F | OXYGEN SATURATION: 90 % | SYSTOLIC BLOOD PRESSURE: 121 MMHG | HEART RATE: 61 BPM | DIASTOLIC BLOOD PRESSURE: 66 MMHG | RESPIRATION RATE: 18 BRPM

## 2018-02-16 LAB
ANION GAP SERPL CALCULATED.3IONS-SCNC: 5 MMOL/L (ref 4–13)
BASOPHILS # BLD AUTO: 0 THOUSANDS/ΜL (ref 0–0.1)
BASOPHILS NFR BLD AUTO: 0 % (ref 0–1)
BUN SERPL-MCNC: 18 MG/DL (ref 5–25)
CALCIUM SERPL-MCNC: 8.6 MG/DL (ref 8.3–10.1)
CHLORIDE SERPL-SCNC: 106 MMOL/L (ref 100–108)
CO2 SERPL-SCNC: 28 MMOL/L (ref 21–32)
CREAT SERPL-MCNC: 0.89 MG/DL (ref 0.6–1.3)
EOSINOPHIL # BLD AUTO: 0.01 THOUSAND/ΜL (ref 0–0.61)
EOSINOPHIL NFR BLD AUTO: 0 % (ref 0–6)
ERYTHROCYTE [DISTWIDTH] IN BLOOD BY AUTOMATED COUNT: 12.7 % (ref 11.6–15.1)
GFR SERPL CREATININE-BSD FRML MDRD: 64 ML/MIN/1.73SQ M
GLUCOSE SERPL-MCNC: 134 MG/DL (ref 65–140)
HCT VFR BLD AUTO: 31.9 % (ref 34.8–46.1)
HGB BLD-MCNC: 10.6 G/DL (ref 11.5–15.4)
LYMPHOCYTES # BLD AUTO: 1.91 THOUSANDS/ΜL (ref 0.6–4.47)
LYMPHOCYTES NFR BLD AUTO: 20 % (ref 14–44)
MCH RBC QN AUTO: 30.7 PG (ref 26.8–34.3)
MCHC RBC AUTO-ENTMCNC: 33.2 G/DL (ref 31.4–37.4)
MCV RBC AUTO: 93 FL (ref 82–98)
MONOCYTES # BLD AUTO: 1.18 THOUSAND/ΜL (ref 0.17–1.22)
MONOCYTES NFR BLD AUTO: 13 % (ref 4–12)
NEUTROPHILS # BLD AUTO: 6.34 THOUSANDS/ΜL (ref 1.85–7.62)
NEUTS SEG NFR BLD AUTO: 67 % (ref 43–75)
PLATELET # BLD AUTO: 242 THOUSANDS/UL (ref 149–390)
PMV BLD AUTO: 9.6 FL (ref 8.9–12.7)
POTASSIUM SERPL-SCNC: 4.3 MMOL/L (ref 3.5–5.3)
RBC # BLD AUTO: 3.45 MILLION/UL (ref 3.81–5.12)
SODIUM SERPL-SCNC: 139 MMOL/L (ref 136–145)
WBC # BLD AUTO: 9.44 THOUSAND/UL (ref 4.31–10.16)

## 2018-02-16 PROCEDURE — 80048 BASIC METABOLIC PNL TOTAL CA: CPT | Performed by: PHYSICIAN ASSISTANT

## 2018-02-16 PROCEDURE — 85025 COMPLETE CBC W/AUTO DIFF WBC: CPT | Performed by: PHYSICIAN ASSISTANT

## 2018-02-16 RX ORDER — METRONIDAZOLE 500 MG/1
500 TABLET ORAL EVERY 8 HOURS SCHEDULED
Status: DISCONTINUED | OUTPATIENT
Start: 2018-02-16 | End: 2018-02-16 | Stop reason: HOSPADM

## 2018-02-16 RX ORDER — OXYCODONE HYDROCHLORIDE AND ACETAMINOPHEN 5; 325 MG/1; MG/1
2 TABLET ORAL EVERY 4 HOURS PRN
Qty: 20 TABLET | Refills: 0 | Status: SHIPPED | OUTPATIENT
Start: 2018-02-16 | End: 2018-02-26

## 2018-02-16 RX ADMIN — CEFAZOLIN SODIUM 1000 MG: 1 SOLUTION INTRAVENOUS at 01:16

## 2018-02-16 RX ADMIN — LEVOTHYROXINE SODIUM 88 MCG: 88 TABLET ORAL at 05:11

## 2018-02-16 RX ADMIN — DILTIAZEM HYDROCHLORIDE 180 MG: 180 CAPSULE, COATED, EXTENDED RELEASE ORAL at 08:05

## 2018-02-16 RX ADMIN — METRONIDAZOLE 500 MG: 500 INJECTION, SOLUTION INTRAVENOUS at 08:05

## 2018-02-16 RX ADMIN — METRONIDAZOLE 500 MG: 500 INJECTION, SOLUTION INTRAVENOUS at 01:55

## 2018-02-16 RX ADMIN — OXYCODONE HYDROCHLORIDE AND ACETAMINOPHEN 1 TABLET: 5; 325 TABLET ORAL at 15:06

## 2018-02-16 RX ADMIN — OXYCODONE HYDROCHLORIDE AND ACETAMINOPHEN 1 TABLET: 5; 325 TABLET ORAL at 08:05

## 2018-02-16 RX ADMIN — CEFAZOLIN SODIUM 1000 MG: 1 SOLUTION INTRAVENOUS at 09:04

## 2018-02-16 NOTE — PROGRESS NOTES
The metronidazole has / have been converted to Oral per [facility] IV-to-PO Auto-Conversion Protocol for Adults as approved by the Pharmacy and Therapeutics Committee  The patient met all eligible criteria:  3 Age = 25years old   2) Received at least one dose of the IV form   3) Receiving at least one other scheduled oral/enteral medication   4) Tolerating an oral/enteral diet   and did not have any exclusions:   1) Critical care patient   2) Active GI bleed (IF assessing H2RAs or PPIs)   3) Continuous tube feeding (IF assessing cipro, doxycycline, levofloxacin, minocycline, rifampin, or voriconazole)   4) Receiving PO vancomycin (IF assessing metronidazole)   5) Persistent nausea and/or vomiting   6) Ileus or gastrointestinal obstruction   7) Cristian/nasogastric tube set for continuous suction   8) Specific order not to automatically convert to PO (in the order's comments or if discussed in the most recent Infectious Disease or primary team's progress notes)

## 2018-02-16 NOTE — PROGRESS NOTES
Progress Note -Surgery PA  Sincere Look 74 y o  female MRN: [id number]  Unit/Bed#: -Jose Encounter: [id number]      Assessment:  74year old female s/p Procedure(s):  APPENDECTOMY LAPAROSCOPIC  Plan:  -DC today with drain and follow up with Dr Joby Orellana  -pain control  -daughter RN and will monitor drain output  -ambulate  -DC with a week of ABX        Subjective/Objective     Subjective: Patient feels well  Tolerating diet  Eluterio Manuel to go home  H&H stable  KAITY 135 in 24 hours  Objective:     /66 (BP Location: Left arm)   Pulse 61   Temp 97 6 °F (36 4 °C) (Oral)   Resp 18   SpO2 90%   I/O last 24 hours: In: 2117 6 [P O :600;  I V :1517 6]  Out: 985 [Urine:850; Drains:135]        Intake/Output Summary (Last 24 hours) at 02/16/18 0747  Last data filed at 02/16/18 1958   Gross per 24 hour   Intake           2117 6 ml   Output              985 ml   Net           1132 6 ml       Invasive Devices     Peripheral Intravenous Line            Peripheral IV 02/14/18 Left Forearm 1 day          Drain            Closed/Suction Drain RUQ Bulb 15 Fr  1 day                Physical Exam:  /66 (BP Location: Left arm)   Pulse 61   Temp 97 6 °F (36 4 °C) (Oral)   Resp 18   SpO2 90%   General appearance: alert and oriented, in no acute distress and alert  Lungs: clear to auscultation bilaterally  Heart: regular rate and rhythm, S1, S2 normal, no murmur, click, rub or gallop  Abdomen: soft, non-tender; bowel sounds normal; no masses,  no organomegalyincisions healing, kaity functioning      Current Facility-Administered Medications:     ceFAZolin (ANCEF) IVPB (premix) 1,000 mg, 1,000 mg, Intravenous, Q8H, Ra Tra PA-GERDA, Last Rate: 100 mL/hr at 02/16/18 0116, 1,000 mg at 02/16/18 0116    diltiazem (CARDIZEM CD) 24 hr capsule 180 mg, 180 mg, Oral, Daily, Jairo Larson MD    HYDROmorphone (DILAUDID) injection 1 mg, 1 mg, Intravenous, Q4H PRN, Ra Tra PA-C    levothyroxine tablet 88 mcg, 88 mcg, Oral, Early Morning, Carolina Smith MD, 88 mcg at 02/16/18 0511    metroNIDAZOLE (FLAGYL) IVPB (premix) 500 mg, 500 mg, Intravenous, Q8H, Ryan Patel PA-C, Last Rate: 200 mL/hr at 02/16/18 0155, 500 mg at 02/16/18 0155    oxyCODONE-acetaminophen (PERCOCET) 5-325 mg per tablet 1 tablet, 1 tablet, Oral, Q4H PRN, Carolina Smith MD, 1 tablet at 02/15/18 1006    oxyCODONE-acetaminophen (PERCOCET) 5-325 mg per tablet 2 tablet, 2 tablet, Oral, Q4H PRN, Carolina Smith MD           Lab, Imaging and other studies:  CBC:   Lab Results   Component Value Date    WBC 9 44 02/16/2018    HGB 10 6 (L) 02/16/2018    HCT 31 9 (L) 02/16/2018    MCV 93 02/16/2018     02/16/2018    MCH 30 7 02/16/2018    MCHC 33 2 02/16/2018    RDW 12 7 02/16/2018    MPV 9 6 02/16/2018   , CMP:   Lab Results   Component Value Date     02/16/2018    K 4 3 02/16/2018     02/16/2018    CO2 28 02/16/2018    ANIONGAP 5 02/16/2018    BUN 18 02/16/2018    CREATININE 0 89 02/16/2018    GLUCOSE 134 02/16/2018    CALCIUM 8 6 02/16/2018    EGFR 64 02/16/2018         VTE Pharmacologic Prophylaxis: Sequential compression device (Venodyne)   VTE Mechanical Prophylaxis: sequential compression device    Rounds performed with nursing

## 2018-02-16 NOTE — PLAN OF CARE
DISCHARGE PLANNING     Discharge to home or other facility with appropriate resources Progressing        INFECTION - ADULT     Absence or prevention of progression during hospitalization Progressing        PAIN - ADULT     Verbalizes/displays adequate comfort level or baseline comfort level Progressing        Potential for Falls     Patient will remain free of falls Progressing        SKIN/TISSUE INTEGRITY - ADULT     Incision(s), wounds(s) or drain site(s) healing without S/S of infection Progressing

## 2018-02-20 ENCOUNTER — TRANSCRIBE ORDERS (OUTPATIENT)
Dept: ADMINISTRATIVE | Facility: HOSPITAL | Age: 73
End: 2018-02-20

## 2018-02-20 DIAGNOSIS — K86.2 CYST AND PSEUDOCYST OF PANCREAS: Primary | ICD-10-CM

## 2018-02-20 DIAGNOSIS — K86.3 CYST AND PSEUDOCYST OF PANCREAS: Primary | ICD-10-CM

## 2018-03-02 ENCOUNTER — HOSPITAL ENCOUNTER (OUTPATIENT)
Dept: MRI IMAGING | Facility: HOSPITAL | Age: 73
Discharge: HOME | End: 2018-03-02
Payer: MEDICARE

## 2018-03-02 DIAGNOSIS — K86.2 CYST AND PSEUDOCYST OF PANCREAS: ICD-10-CM

## 2018-03-02 DIAGNOSIS — K86.3 CYST AND PSEUDOCYST OF PANCREAS: ICD-10-CM

## 2018-03-02 PROCEDURE — A9585 GADOBUTROL INJECTION: HCPCS | Performed by: RADIOLOGY

## 2018-03-02 PROCEDURE — 76377 3D RENDER W/INTRP POSTPROCES: CPT

## 2018-03-02 PROCEDURE — 74183 MRI ABD W/O CNTR FLWD CNTR: CPT

## 2018-03-02 RX ADMIN — GADOBUTROL 7 ML: 604.72 INJECTION INTRAVENOUS at 11:44

## 2018-03-29 ENCOUNTER — HOSPITAL ENCOUNTER (OUTPATIENT)
Dept: MAMMOGRAPHY | Facility: HOSPITAL | Age: 73
Discharge: HOME | End: 2018-03-29
Attending: INTERNAL MEDICINE
Payer: MEDICARE

## 2018-03-29 DIAGNOSIS — Z12.31 ENCOUNTER FOR SCREENING MAMMOGRAM FOR MALIGNANT NEOPLASM OF BREAST: ICD-10-CM

## 2018-03-29 DIAGNOSIS — Z12.39 SCREENING BREAST EXAMINATION: ICD-10-CM

## 2018-03-29 PROCEDURE — 77067 SCR MAMMO BI INCL CAD: CPT

## 2018-04-02 ENCOUNTER — TELEPHONE (OUTPATIENT)
Dept: HEMATOLOGY ONCOLOGY | Facility: CLINIC | Age: 73
End: 2018-04-02

## 2018-04-02 NOTE — TELEPHONE ENCOUNTER
----- Message from Arlene Kraft MD sent at 3/30/2018  5:14 PM EDT -----  See recent mammo  They want to have more imaging study on right  breat  They did not say what  Can you call pt and make sure she has further imaging study set up by [facility]?

## 2018-04-03 ENCOUNTER — TELEPHONE (OUTPATIENT)
Dept: HEMATOLOGY ONCOLOGY | Facility: CLINIC | Age: 73
End: 2018-04-03

## 2018-04-03 ENCOUNTER — HOSPITAL ENCOUNTER (OUTPATIENT)
Dept: MAMMOGRAPHY | Facility: CLINIC | Age: 73
Discharge: HOME | End: 2018-04-03
Payer: MEDICARE

## 2018-04-03 ENCOUNTER — HOSPITAL ENCOUNTER (OUTPATIENT)
Dept: ULTRASOUND IMAGING | Facility: CLINIC | Age: 73
Discharge: HOME | End: 2018-04-03
Payer: MEDICARE

## 2018-04-03 DIAGNOSIS — R92.8 ABNORMAL MAMMOGRAM: ICD-10-CM

## 2018-04-03 PROCEDURE — 77065 DX MAMMO INCL CAD UNI: CPT

## 2018-04-03 PROCEDURE — G0279 TOMOSYNTHESIS, MAMMO: HCPCS

## 2018-04-03 NOTE — TELEPHONE ENCOUNTER
----- Message from Nata Kingsley MD sent at 4/3/2018  8:01 AM EDT -----  Call pt   Diagnostic mammo was normal

## 2018-04-26 ENCOUNTER — TRANSCRIBE ORDERS (OUTPATIENT)
Dept: LAB | Facility: CLINIC | Age: 73
End: 2018-04-26

## 2018-04-26 ENCOUNTER — APPOINTMENT (OUTPATIENT)
Dept: LAB | Facility: CLINIC | Age: 73
End: 2018-04-26
Payer: MEDICARE

## 2018-04-26 DIAGNOSIS — E78.00 HYPERCHOLESTEROLEMIA: Primary | ICD-10-CM

## 2018-04-26 DIAGNOSIS — R00.2 PALPITATIONS: ICD-10-CM

## 2018-04-26 DIAGNOSIS — Z79.899 LONG TERM CURRENT USE OF ANTIARRHYTHMIC MEDICAL THERAPY: ICD-10-CM

## 2018-04-26 DIAGNOSIS — E78.00 HYPERCHOLESTEROLEMIA: ICD-10-CM

## 2018-04-26 DIAGNOSIS — I10 ESSENTIAL HYPERTENSION, MALIGNANT: ICD-10-CM

## 2018-04-26 DIAGNOSIS — E03.9 HYPOTHYROIDISM, UNSPECIFIED TYPE: ICD-10-CM

## 2018-04-26 DIAGNOSIS — Z13.9 SCREENING FOR CONDITION: ICD-10-CM

## 2018-04-26 DIAGNOSIS — I48.91 ATRIAL FIBRILLATION, UNSPECIFIED TYPE (HCC): ICD-10-CM

## 2018-04-26 DIAGNOSIS — E78.00 ELEVATED CHOLESTEROL: ICD-10-CM

## 2018-04-26 DIAGNOSIS — Z13.9 SCREENING FOR CONDITION: Primary | ICD-10-CM

## 2018-04-26 LAB
ALBUMIN SERPL BCP-MCNC: 3.5 G/DL (ref 3.5–5)
ALP SERPL-CCNC: 98 U/L (ref 46–116)
ALT SERPL W P-5'-P-CCNC: 32 U/L (ref 12–78)
ANION GAP SERPL CALCULATED.3IONS-SCNC: 6 MMOL/L (ref 4–13)
AST SERPL W P-5'-P-CCNC: 20 U/L (ref 5–45)
BASOPHILS # BLD AUTO: 0 THOUSANDS/ΜL (ref 0–0.1)
BASOPHILS NFR BLD AUTO: 0 % (ref 0–1)
BILIRUB DIRECT SERPL-MCNC: 0.16 MG/DL (ref 0–0.2)
BILIRUB SERPL-MCNC: 0.6 MG/DL (ref 0.2–1)
BUN SERPL-MCNC: 25 MG/DL (ref 5–25)
CALCIUM SERPL-MCNC: 9.1 MG/DL (ref 8.3–10.1)
CHLORIDE SERPL-SCNC: 104 MMOL/L (ref 100–108)
CHOLEST SERPL-MCNC: 205 MG/DL (ref 50–200)
CK SERPL-CCNC: 34 U/L (ref 26–192)
CO2 SERPL-SCNC: 32 MMOL/L (ref 21–32)
CREAT SERPL-MCNC: 0.93 MG/DL (ref 0.6–1.3)
EOSINOPHIL # BLD AUTO: 0.18 THOUSAND/ΜL (ref 0–0.61)
EOSINOPHIL NFR BLD AUTO: 3 % (ref 0–6)
ERYTHROCYTE [DISTWIDTH] IN BLOOD BY AUTOMATED COUNT: 13.1 % (ref 11.6–15.1)
GFR SERPL CREATININE-BSD FRML MDRD: 61 ML/MIN/1.73SQ M
GLUCOSE P FAST SERPL-MCNC: 105 MG/DL (ref 65–99)
HCT VFR BLD AUTO: 39.9 % (ref 34.8–46.1)
HDLC SERPL-MCNC: 121 MG/DL (ref 40–60)
HGB BLD-MCNC: 13 G/DL (ref 11.5–15.4)
LDLC SERPL CALC-MCNC: 76 MG/DL (ref 0–100)
LYMPHOCYTES # BLD AUTO: 2.6 THOUSANDS/ΜL (ref 0.6–4.47)
LYMPHOCYTES NFR BLD AUTO: 46 % (ref 14–44)
MCH RBC QN AUTO: 30.1 PG (ref 26.8–34.3)
MCHC RBC AUTO-ENTMCNC: 32.6 G/DL (ref 31.4–37.4)
MCV RBC AUTO: 92 FL (ref 82–98)
MONOCYTES # BLD AUTO: 0.63 THOUSAND/ΜL (ref 0.17–1.22)
MONOCYTES NFR BLD AUTO: 11 % (ref 4–12)
NEUTROPHILS # BLD AUTO: 2.24 THOUSANDS/ΜL (ref 1.85–7.62)
NEUTS SEG NFR BLD AUTO: 40 % (ref 43–75)
NONHDLC SERPL-MCNC: 84 MG/DL
PLATELET # BLD AUTO: 272 THOUSANDS/UL (ref 149–390)
PMV BLD AUTO: 9.4 FL (ref 8.9–12.7)
POTASSIUM SERPL-SCNC: 4.5 MMOL/L (ref 3.5–5.3)
PROT SERPL-MCNC: 7.1 G/DL (ref 6.4–8.2)
RBC # BLD AUTO: 4.32 MILLION/UL (ref 3.81–5.12)
SODIUM SERPL-SCNC: 142 MMOL/L (ref 136–145)
TRIGL SERPL-MCNC: 39 MG/DL
TSH SERPL DL<=0.05 MIU/L-ACNC: 5.67 UIU/ML (ref 0.36–3.74)
WBC # BLD AUTO: 5.65 THOUSAND/UL (ref 4.31–10.16)

## 2018-04-26 PROCEDURE — 80061 LIPID PANEL: CPT

## 2018-04-26 PROCEDURE — 80053 COMPREHEN METABOLIC PANEL: CPT

## 2018-04-26 PROCEDURE — 36415 COLL VENOUS BLD VENIPUNCTURE: CPT

## 2018-04-26 PROCEDURE — 84443 ASSAY THYROID STIM HORMONE: CPT

## 2018-04-26 PROCEDURE — 82248 BILIRUBIN DIRECT: CPT

## 2018-04-26 PROCEDURE — 85025 COMPLETE CBC W/AUTO DIFF WBC: CPT

## 2018-04-26 PROCEDURE — 82550 ASSAY OF CK (CPK): CPT

## 2018-09-06 ENCOUNTER — APPOINTMENT (OUTPATIENT)
Dept: LAB | Facility: CLINIC | Age: 73
End: 2018-09-06
Payer: MEDICARE

## 2018-09-06 ENCOUNTER — TRANSCRIBE ORDERS (OUTPATIENT)
Dept: LAB | Facility: CLINIC | Age: 73
End: 2018-09-06

## 2018-09-06 DIAGNOSIS — K86.3 CYST AND PSEUDOCYST OF PANCREAS: Primary | ICD-10-CM

## 2018-09-06 DIAGNOSIS — K86.3 CYST AND PSEUDOCYST OF PANCREAS: ICD-10-CM

## 2018-09-06 DIAGNOSIS — K86.2 CYST AND PSEUDOCYST OF PANCREAS: ICD-10-CM

## 2018-09-06 DIAGNOSIS — K86.2 CYST AND PSEUDOCYST OF PANCREAS: Primary | ICD-10-CM

## 2018-09-06 LAB
ALBUMIN SERPL BCP-MCNC: 3.7 G/DL (ref 3.5–5)
ALP SERPL-CCNC: 107 U/L (ref 46–116)
ALT SERPL W P-5'-P-CCNC: 28 U/L (ref 12–78)
ANION GAP SERPL CALCULATED.3IONS-SCNC: 4 MMOL/L (ref 4–13)
AST SERPL W P-5'-P-CCNC: 20 U/L (ref 5–45)
BASOPHILS # BLD AUTO: 0.01 THOUSANDS/ΜL (ref 0–0.1)
BASOPHILS NFR BLD AUTO: 0 % (ref 0–1)
BILIRUB SERPL-MCNC: 0.8 MG/DL (ref 0.2–1)
BUN SERPL-MCNC: 20 MG/DL (ref 5–25)
CALCIUM SERPL-MCNC: 9.5 MG/DL (ref 8.3–10.1)
CEA SERPL-MCNC: 2.6 NG/ML (ref 0–3)
CHLORIDE SERPL-SCNC: 104 MMOL/L (ref 100–108)
CO2 SERPL-SCNC: 33 MMOL/L (ref 21–32)
CREAT SERPL-MCNC: 0.98 MG/DL (ref 0.6–1.3)
EOSINOPHIL # BLD AUTO: 0.17 THOUSAND/ΜL (ref 0–0.61)
EOSINOPHIL NFR BLD AUTO: 3 % (ref 0–6)
ERYTHROCYTE [DISTWIDTH] IN BLOOD BY AUTOMATED COUNT: 12.3 % (ref 11.6–15.1)
GFR SERPL CREATININE-BSD FRML MDRD: 57 ML/MIN/1.73SQ M
GLUCOSE SERPL-MCNC: 118 MG/DL (ref 65–140)
HCT VFR BLD AUTO: 42.2 % (ref 34.8–46.1)
HGB BLD-MCNC: 13.9 G/DL (ref 11.5–15.4)
IMM GRANULOCYTES # BLD AUTO: 0.01 THOUSAND/UL (ref 0–0.2)
IMM GRANULOCYTES NFR BLD AUTO: 0 % (ref 0–2)
LYMPHOCYTES # BLD AUTO: 2.48 THOUSANDS/ΜL (ref 0.6–4.47)
LYMPHOCYTES NFR BLD AUTO: 36 % (ref 14–44)
MCH RBC QN AUTO: 31 PG (ref 26.8–34.3)
MCHC RBC AUTO-ENTMCNC: 32.9 G/DL (ref 31.4–37.4)
MCV RBC AUTO: 94 FL (ref 82–98)
MONOCYTES # BLD AUTO: 0.77 THOUSAND/ΜL (ref 0.17–1.22)
MONOCYTES NFR BLD AUTO: 11 % (ref 4–12)
NEUTROPHILS # BLD AUTO: 3.47 THOUSANDS/ΜL (ref 1.85–7.62)
NEUTS SEG NFR BLD AUTO: 50 % (ref 43–75)
NRBC BLD AUTO-RTO: 0 /100 WBCS
PLATELET # BLD AUTO: 293 THOUSANDS/UL (ref 149–390)
PMV BLD AUTO: 9.4 FL (ref 8.9–12.7)
POTASSIUM SERPL-SCNC: 4 MMOL/L (ref 3.5–5.3)
PROT SERPL-MCNC: 7.6 G/DL (ref 6.4–8.2)
RBC # BLD AUTO: 4.49 MILLION/UL (ref 3.81–5.12)
SODIUM SERPL-SCNC: 141 MMOL/L (ref 136–145)
WBC # BLD AUTO: 6.91 THOUSAND/UL (ref 4.31–10.16)

## 2018-09-06 PROCEDURE — 85025 COMPLETE CBC W/AUTO DIFF WBC: CPT

## 2018-09-06 PROCEDURE — 80053 COMPREHEN METABOLIC PANEL: CPT

## 2018-09-06 PROCEDURE — 36415 COLL VENOUS BLD VENIPUNCTURE: CPT

## 2018-09-06 PROCEDURE — 82378 CARCINOEMBRYONIC ANTIGEN: CPT

## 2018-09-06 PROCEDURE — 86301 IMMUNOASSAY TUMOR CA 19-9: CPT

## 2018-09-07 LAB — CANCER AG19-9 SERPL-ACNC: 6 U/ML (ref 0–35)

## 2018-12-04 ENCOUNTER — OFFICE VISIT (OUTPATIENT)
Dept: HEMATOLOGY ONCOLOGY | Facility: CLINIC | Age: 73
End: 2018-12-04
Payer: MEDICARE

## 2018-12-04 VITALS
RESPIRATION RATE: 16 BRPM | DIASTOLIC BLOOD PRESSURE: 72 MMHG | HEART RATE: 90 BPM | BODY MASS INDEX: 29.23 KG/M2 | SYSTOLIC BLOOD PRESSURE: 120 MMHG | WEIGHT: 165 LBS | HEIGHT: 63 IN | OXYGEN SATURATION: 92 % | TEMPERATURE: 97.8 F

## 2018-12-04 DIAGNOSIS — Z17.0 MALIGNANT NEOPLASM OF RIGHT BREAST IN FEMALE, ESTROGEN RECEPTOR POSITIVE, UNSPECIFIED SITE OF BREAST (HCC): Primary | ICD-10-CM

## 2018-12-04 DIAGNOSIS — C50.911 MALIGNANT NEOPLASM OF RIGHT BREAST IN FEMALE, ESTROGEN RECEPTOR POSITIVE, UNSPECIFIED SITE OF BREAST (HCC): Primary | ICD-10-CM

## 2018-12-04 PROCEDURE — 99214 OFFICE O/P EST MOD 30 MIN: CPT | Performed by: INTERNAL MEDICINE

## 2018-12-04 RX ORDER — DIPHENOXYLATE HYDROCHLORIDE AND ATROPINE SULFATE 2.5; .025 MG/1; MG/1
1 TABLET ORAL DAILY
COMMUNITY

## 2018-12-04 RX ORDER — PHENOL 1.4 %
600 AEROSOL, SPRAY (ML) MUCOUS MEMBRANE 2 TIMES DAILY WITH MEALS
COMMUNITY

## 2018-12-04 NOTE — PROGRESS NOTES
Hematology / Oncology Outpatient Follow Up Note    Natalia Valencia 74 y o  female ZCL:[birth year] UBZ:[id number]         Date:  12/4/2018    Assessment / Plan:  A 74year old postmenopausal woman with stage IA right breast cancer, grade 1, ER/OK positive HER-2 negative disease  She completed 5 years of adjuvant hormonal therapy with anastrozole in September 2014  Clinically, she has no evidence recurrent disease  I recommended her to continue with annual surveillance  She is in agreement with my recommendation  I will see her again in a year for routine follow-up      Subjective:     HPI:          Interval History:  A 74year old postmenopausal woman with stage IA right breast cancer, grade 1, ER/OK positive HER-2 negative disease diagnosed in May 2009  She underwent lumpectomy resulting in the WILLOW followed by adjuvant hormonal therapy with anastrozole  She came in today for routine followup  She completed 5 years of adjuvant hormonal therapy with anastrozole in September 2014  She continued to do well  She has no new complaint  She has no complaint of pain  She denied respiratory symptoms  She has atrial fibrillation for which she is on rivaroxaban  She also had takes thyroid medications  Her weight is absolutely stable  Her performance status is normal     Objective:     Primary Diagnosis:    Right breast cancer, stage IA (pT1c, pN0, M0) grade 1, ER/OK positive, HER2 negative disease, diagnosed in May of 2009  Cancer Staging:  Cancer Staging  No matching staging information was found for the patient  Previous Hematologic/ Oncologic Treatment:     Adjuvant hormonal therapy with anastrozole 1 mg once a day from September 2009 through September 2014  Current Hematologic/ Oncologic Treatment:      Observation  Disease Status:     WILLOW status post lumpectomy with sentinel lymph node biopsy       Test Results:    Pathology:        Radiology:    Bilateral screening mammography in April 2018 was benign  BI-RADS 2  Laboratory:        Physical Exam:      General Appearance:    Alert, oriented        Eyes:    PERRL   Ears:    Normal external ear canals, both ears   Nose:   Nares normal, septum midline   Throat:   Mucosa moist  Pharynx without injection  Neck:   Supple       Lungs:     Clear to auscultation bilaterally   Chest Wall:    No tenderness or deformity    Heart:    Regular rate and rhythm       Abdomen:     Soft, non-tender, bowel sounds +, no organomegaly           Extremities:   Extremities no cyanosis or edema       Skin:   no rash or icterus  Lymph nodes:   Cervical, supraclavicular, and axillary nodes normal   Neurologic:   CNII-XII intact, normal strength, sensation and reflexes     Throughout          Breast exam:   lumpectomy scar in the outer upper quadrant of the right breast with no palpable mass or nodule  The left breast is negative  ROS: Review of Systems   All other systems reviewed and are negative  Imaging: No results found  Labs:   Lab Results   Component Value Date    WBC 6 91 09/06/2018    HGB 13 9 09/06/2018    HCT 42 2 09/06/2018    MCV 94 09/06/2018     09/06/2018     Lab Results   Component Value Date     05/26/2015    K 4 0 09/06/2018     09/06/2018    CO2 33 (H) 09/06/2018    ANIONGAP 7 05/26/2015    BUN 20 09/06/2018    CREATININE 0 98 09/06/2018    GLUCOSE 92 05/26/2015    GLUF 105 (H) 04/26/2018    CALCIUM 9 5 09/06/2018    AST 20 09/06/2018    ALT 28 09/06/2018    ALKPHOS 107 09/06/2018    PROT 7 2 05/26/2015    BILITOT 0 8 05/26/2015    EGFR 57 09/06/2018       Lab Results   Component Value Date    CEA 2 6 09/06/2018       Current Medications: Reviewed  Allergies: Reviewed  PMH/FH/SH:  Reviewed      Vital Sign:    Body surface area is 1 78 meters squared      Wt Readings from Last 3 Encounters:   12/04/18 74 8 kg (165 lb)   03/02/18 71 7 kg (158 lb)   11/30/17 74 4 kg (164 lb)        Temp Readings from Last 3 Encounters: 12/04/18 97 8 °F (36 6 °C) (Tympanic)   02/16/18 97 6 °F (36 4 °C) (Oral)   11/28/17 99 1 °F (37 3 °C)        BP Readings from Last 3 Encounters:   12/04/18 120/72   02/16/18 121/66   11/30/17 108/62         Pulse Readings from Last 3 Encounters:   12/04/18 90   02/16/18 61   11/28/17 (!) 110     @LASTSAO2(3)@

## 2019-01-14 ENCOUNTER — TELEPHONE (OUTPATIENT)
Dept: HEMATOLOGY ONCOLOGY | Facility: CLINIC | Age: 74
End: 2019-01-14

## 2019-01-14 DIAGNOSIS — Z85.3 PERSONAL HISTORY OF MALIGNANT NEOPLASM OF BREAST: Primary | ICD-10-CM

## 2019-01-14 NOTE — TELEPHONE ENCOUNTER
Dr Rosi Goetz ordered an u/s of left breast, pt is going to go to [address] in [facility] 1/15 @ 9:45 for u/s  Contacted pt she is aware

## 2019-01-14 NOTE — TELEPHONE ENCOUNTER
Patient states she found a lump in her breast, and does not have an upcoming appointment with Dr Nelson Quiñonez  She would like to know what the next step is, should she make an appointment or does she need a mammogram?    She asked if someone could give her a call and advise her what to do      She can be reached at [phone]

## 2019-01-15 ENCOUNTER — HOSPITAL ENCOUNTER (OUTPATIENT)
Dept: MAMMOGRAPHY | Facility: CLINIC | Age: 74
Discharge: HOME | End: 2019-01-15
Payer: MEDICARE

## 2019-01-15 ENCOUNTER — HOSPITAL ENCOUNTER (OUTPATIENT)
Dept: ULTRASOUND IMAGING | Facility: CLINIC | Age: 74
Discharge: HOME | End: 2019-01-15
Payer: MEDICARE

## 2019-01-15 ENCOUNTER — TELEPHONE (OUTPATIENT)
Dept: HEMATOLOGY ONCOLOGY | Facility: CLINIC | Age: 74
End: 2019-01-15

## 2019-01-15 DIAGNOSIS — Z85.3 PERSONAL HISTORY OF MALIGNANT NEOPLASM OF BREAST: ICD-10-CM

## 2019-01-15 PROCEDURE — 77066 DX MAMMO INCL CAD BI: CPT

## 2019-01-15 PROCEDURE — 76642 ULTRASOUND BREAST LIMITED: CPT

## 2019-01-15 PROCEDURE — G0279 TOMOSYNTHESIS, MAMMO: HCPCS

## 2019-03-07 ENCOUNTER — APPOINTMENT (OUTPATIENT)
Dept: LAB | Facility: CLINIC | Age: 74
End: 2019-03-07
Payer: MEDICARE

## 2019-03-07 ENCOUNTER — TRANSCRIBE ORDERS (OUTPATIENT)
Dept: LAB | Facility: CLINIC | Age: 74
End: 2019-03-07

## 2019-03-07 DIAGNOSIS — K86.3 CYST AND PSEUDOCYST OF PANCREAS: ICD-10-CM

## 2019-03-07 DIAGNOSIS — G89.3 NEOPLASM RELATED PAIN: Primary | ICD-10-CM

## 2019-03-07 DIAGNOSIS — K86.2 CYST AND PSEUDOCYST OF PANCREAS: ICD-10-CM

## 2019-03-07 DIAGNOSIS — G89.3 NEOPLASM RELATED PAIN: ICD-10-CM

## 2019-03-07 LAB
ALBUMIN SERPL BCP-MCNC: 3.7 G/DL (ref 3.5–5)
ALP SERPL-CCNC: 95 U/L (ref 46–116)
ALT SERPL W P-5'-P-CCNC: 30 U/L (ref 12–78)
ANION GAP SERPL CALCULATED.3IONS-SCNC: 8 MMOL/L (ref 4–13)
AST SERPL W P-5'-P-CCNC: 19 U/L (ref 5–45)
BASOPHILS # BLD AUTO: 0.01 THOUSANDS/ΜL (ref 0–0.1)
BASOPHILS NFR BLD AUTO: 0 % (ref 0–1)
BILIRUB SERPL-MCNC: 1 MG/DL (ref 0.2–1)
BUN SERPL-MCNC: 17 MG/DL (ref 5–25)
CALCIUM SERPL-MCNC: 9.1 MG/DL (ref 8.3–10.1)
CEA SERPL-MCNC: 2.6 NG/ML (ref 0–3)
CHLORIDE SERPL-SCNC: 102 MMOL/L (ref 100–108)
CO2 SERPL-SCNC: 33 MMOL/L (ref 21–32)
CREAT SERPL-MCNC: 0.98 MG/DL (ref 0.6–1.3)
EOSINOPHIL # BLD AUTO: 0.12 THOUSAND/ΜL (ref 0–0.61)
EOSINOPHIL NFR BLD AUTO: 2 % (ref 0–6)
ERYTHROCYTE [DISTWIDTH] IN BLOOD BY AUTOMATED COUNT: 12.8 % (ref 11.6–15.1)
GFR SERPL CREATININE-BSD FRML MDRD: 57 ML/MIN/1.73SQ M
GLUCOSE SERPL-MCNC: 135 MG/DL (ref 65–140)
HCT VFR BLD AUTO: 40.5 % (ref 34.8–46.1)
HGB BLD-MCNC: 13.2 G/DL (ref 11.5–15.4)
IMM GRANULOCYTES # BLD AUTO: 0.01 THOUSAND/UL (ref 0–0.2)
IMM GRANULOCYTES NFR BLD AUTO: 0 % (ref 0–2)
LYMPHOCYTES # BLD AUTO: 2.22 THOUSANDS/ΜL (ref 0.6–4.47)
LYMPHOCYTES NFR BLD AUTO: 34 % (ref 14–44)
MCH RBC QN AUTO: 30.8 PG (ref 26.8–34.3)
MCHC RBC AUTO-ENTMCNC: 32.6 G/DL (ref 31.4–37.4)
MCV RBC AUTO: 95 FL (ref 82–98)
MONOCYTES # BLD AUTO: 0.81 THOUSAND/ΜL (ref 0.17–1.22)
MONOCYTES NFR BLD AUTO: 13 % (ref 4–12)
NEUTROPHILS # BLD AUTO: 3.31 THOUSANDS/ΜL (ref 1.85–7.62)
NEUTS SEG NFR BLD AUTO: 51 % (ref 43–75)
NRBC BLD AUTO-RTO: 0 /100 WBCS
PLATELET # BLD AUTO: 272 THOUSANDS/UL (ref 149–390)
PMV BLD AUTO: 9.4 FL (ref 8.9–12.7)
POTASSIUM SERPL-SCNC: 3.8 MMOL/L (ref 3.5–5.3)
PROT SERPL-MCNC: 7.3 G/DL (ref 6.4–8.2)
RBC # BLD AUTO: 4.28 MILLION/UL (ref 3.81–5.12)
SODIUM SERPL-SCNC: 143 MMOL/L (ref 136–145)
WBC # BLD AUTO: 6.48 THOUSAND/UL (ref 4.31–10.16)

## 2019-03-07 PROCEDURE — 85025 COMPLETE CBC W/AUTO DIFF WBC: CPT

## 2019-03-07 PROCEDURE — 80053 COMPREHEN METABOLIC PANEL: CPT

## 2019-03-07 PROCEDURE — 86301 IMMUNOASSAY TUMOR CA 19-9: CPT

## 2019-03-07 PROCEDURE — 36415 COLL VENOUS BLD VENIPUNCTURE: CPT

## 2019-03-07 PROCEDURE — 82378 CARCINOEMBRYONIC ANTIGEN: CPT

## 2019-03-08 LAB — CANCER AG19-9 SERPL-ACNC: 7 U/ML (ref 0–35)

## 2019-03-11 ENCOUNTER — HOSPITAL ENCOUNTER (OUTPATIENT)
Dept: CT IMAGING | Facility: HOSPITAL | Age: 74
Discharge: HOME | End: 2019-03-11
Attending: SURGERY
Payer: MEDICARE

## 2019-03-11 DIAGNOSIS — K86.2 CYST AND PSEUDOCYST OF PANCREAS: ICD-10-CM

## 2019-03-11 DIAGNOSIS — K86.3 CYST AND PSEUDOCYST OF PANCREAS: ICD-10-CM

## 2019-03-11 PROCEDURE — 74160 CT ABDOMEN W/CONTRAST: CPT

## 2019-03-11 RX ADMIN — IOHEXOL 100 ML: 350 INJECTION, SOLUTION INTRAVENOUS at 10:29

## 2019-06-17 ENCOUNTER — TELEPHONE (OUTPATIENT)
Dept: HEMATOLOGY ONCOLOGY | Facility: CLINIC | Age: 74
End: 2019-06-17

## 2019-11-12 ENCOUNTER — TRANSCRIBE ORDERS (OUTPATIENT)
Dept: ADMINISTRATIVE | Facility: HOSPITAL | Age: 74
End: 2019-11-12

## 2019-11-12 DIAGNOSIS — K86.2 PANCREATIC CYST: Primary | ICD-10-CM

## 2019-12-03 ENCOUNTER — TELEPHONE (OUTPATIENT)
Dept: HEMATOLOGY ONCOLOGY | Facility: CLINIC | Age: 74
End: 2019-12-03

## 2019-12-04 ENCOUNTER — TELEPHONE (OUTPATIENT)
Dept: HEMATOLOGY ONCOLOGY | Facility: CLINIC | Age: 74
End: 2019-12-04

## 2019-12-04 NOTE — TELEPHONE ENCOUNTER
Spoke with Jus Osorio he is okay with this, called patient and informed her on 12/4- she is aware an voiced understanding

## 2020-01-04 ENCOUNTER — APPOINTMENT (EMERGENCY)
Dept: RADIOLOGY | Facility: HOSPITAL | Age: 75
End: 2020-01-04
Payer: MEDICARE

## 2020-01-04 ENCOUNTER — HOSPITAL ENCOUNTER (EMERGENCY)
Facility: HOSPITAL | Age: 75
Discharge: HOME | End: 2020-01-05
Attending: EMERGENCY MEDICINE | Admitting: EMERGENCY MEDICINE
Payer: MEDICARE

## 2020-01-04 ENCOUNTER — APPOINTMENT (EMERGENCY)
Dept: CT IMAGING | Facility: HOSPITAL | Age: 75
End: 2020-01-04
Payer: MEDICARE

## 2020-01-04 VITALS
DIASTOLIC BLOOD PRESSURE: 49 MMHG | RESPIRATION RATE: 18 BRPM | SYSTOLIC BLOOD PRESSURE: 100 MMHG | WEIGHT: 149 LBS | BODY MASS INDEX: 26.39 KG/M2 | TEMPERATURE: 98.1 F | HEART RATE: 71 BPM | OXYGEN SATURATION: 96 %

## 2020-01-04 DIAGNOSIS — R04.0 EPISTAXIS DUE TO TRAUMA: ICD-10-CM

## 2020-01-04 DIAGNOSIS — S69.92XA INJURY OF LEFT HAND, INITIAL ENCOUNTER: ICD-10-CM

## 2020-01-04 DIAGNOSIS — S01.511A LIP LACERATION, INITIAL ENCOUNTER: ICD-10-CM

## 2020-01-04 DIAGNOSIS — S09.90XA INJURY OF HEAD, INITIAL ENCOUNTER: Primary | ICD-10-CM

## 2020-01-04 DIAGNOSIS — S02.2XXA CLOSED NONDISPLACED FRACTURE OF NASAL BONE, INITIAL ENCOUNTER: ICD-10-CM

## 2020-01-04 DIAGNOSIS — S01.81XA FACIAL LACERATION, INITIAL ENCOUNTER: ICD-10-CM

## 2020-01-04 PROCEDURE — 99284 EMERGENCY DEPT VISIT MOD MDM: CPT

## 2020-01-04 PROCEDURE — 70450 CT HEAD/BRAIN W/O DYE: CPT

## 2020-01-04 PROCEDURE — 73130 X-RAY EXAM OF HAND: CPT

## 2020-01-04 PROCEDURE — 70486 CT MAXILLOFACIAL W/O DYE: CPT

## 2020-01-04 PROCEDURE — 90471 IMMUNIZATION ADMIN: CPT

## 2020-01-04 PROCEDURE — 90715 TDAP VACCINE 7 YRS/> IM: CPT | Performed by: PHYSICIAN ASSISTANT

## 2020-01-04 RX ORDER — LIDOCAINE HYDROCHLORIDE 10 MG/ML
10 INJECTION, SOLUTION EPIDURAL; INFILTRATION; INTRACAUDAL; PERINEURAL ONCE
Status: COMPLETED | OUTPATIENT
Start: 2020-01-04 | End: 2020-01-04

## 2020-01-04 RX ORDER — LIDOCAINE 40 MG/G
CREAM TOPICAL ONCE
Status: COMPLETED | OUTPATIENT
Start: 2020-01-04 | End: 2020-01-04

## 2020-01-04 RX ADMIN — LIDOCAINE: 40 CREAM TOPICAL at 22:27

## 2020-01-04 RX ADMIN — LIDOCAINE HYDROCHLORIDE 10 ML: 10 INJECTION, SOLUTION EPIDURAL; INFILTRATION; INTRACAUDAL; PERINEURAL at 22:27

## 2020-01-04 RX ADMIN — TETANUS TOXOID, REDUCED DIPHTHERIA TOXOID AND ACELLULAR PERTUSSIS VACCINE, ADSORBED 0.5 ML: 5; 2.5; 8; 8; 2.5 SUSPENSION INTRAMUSCULAR at 23:11

## 2020-01-05 PROCEDURE — 12053 INTMD RPR FACE/MM 5.1-7.5 CM: CPT | Performed by: PHYSICIAN ASSISTANT

## 2020-01-05 PROCEDURE — 99284 EMERGENCY DEPT VISIT MOD MDM: CPT | Performed by: PHYSICIAN ASSISTANT

## 2020-01-05 RX ORDER — GINSENG 100 MG
1 CAPSULE ORAL ONCE
Status: DISCONTINUED | OUTPATIENT
Start: 2020-01-05 | End: 2020-01-05 | Stop reason: HOSPADM

## 2020-01-08 NOTE — ED PROVIDER NOTES
Pt Name: Hayden Lopez  MRN: [id number]  YOB: 1945  Age/Sex: 66 y o  female  Date of evaluation: 1/4/2020  PCP: Jeyson Buitrago MD    [address] [place] [address]    Chief Complaint   Patient presents with    Head Injury     patient is on xarelto  patient slipped on wet surface face hit metal bars  no loc  witnessed event         HPI    Kaley Payan presents to the Emergency Department complaining of Fall  History provided by:  Patient   used: No    Head Laceration   Location:  Face  Facial laceration location:  Upper lip and forehead  Length:  5 cm and 1 5 cm  Depth:   Through dermis  Quality: straight    Bleeding: controlled with pressure    Time since incident:  4 hours  Laceration mechanism:  Fall (Pt reports laceration after slipping on wet surface outside, hitting face on metal bars--witnessed by daughter who states no LOC though pt is on Xarelto for A Fib)  Pain details:     Quality:  Unable to specify    Severity:  No pain    Timing:  Unable to specify    Progression:  Unchanged  Foreign body present:  No foreign bodies  Relieved by:  Nothing  Worsened by:  Nothing  Ineffective treatments:  None tried  Tetanus status:  Unknown  Associated symptoms: no fever, no focal weakness, no numbness, no rash, no redness, no swelling and no streaking          Past Medical and Surgical History    Past Medical History:   Diagnosis Date    Atrial fibrillation ([facility] [address] )     Breast CA ([facility] [address] ) 2009    Right    History of radiation therapy     Hypothyroid        Past Surgical History:   Procedure Laterality Date    BREAST LUMPECTOMY Right 2009    MOUTH SURGERY      PILONIDAL CYST EXCISION      MT LAP,APPENDECTOMY N/A 2/14/2018    Procedure: APPENDECTOMY LAPAROSCOPIC;  Surgeon: Cindy Argueta MD;  Location: [facility];  Service: General       Family History   Problem Relation Age of Onset    Throat cancer Father 61    Pancreatic cancer Paternal Aunt 80    Throat cancer Paternal Uncle 48       Social History     Tobacco Use    Smoking status: Former Smoker     Packs/day: 1 00     Years: 40 00     Pack years: 40 00     Last attempt to quit: 2005     Years since quittin 9   Substance Use Topics    Alcohol use: Yes     Alcohol/week: 7 0 standard drinks     Types: 7 Glasses of wine per week    Drug use: No       Allergies    No Known Allergies    Home Medications:    Prior to Admission medications    Medication Sig Start Date End Date Taking? Authorizing Provider   calcium carbonate (OS-ANALISA) 600 MG tablet Take 600 mg by mouth 2 (two) times a day with meals    Historical Provider, MD   diltiazem (CARDIZEM) 90 mg tablet Take 180 mg by mouth daily    Historical Provider, MD   levothyroxine 100 mcg tablet Take 88 mcg by mouth daily    Historical Provider, MD   multivitamin (THERAGRAN) TABS Take 1 tablet by mouth daily    Historical Provider, MD   rivaroxaban (XARELTO) 20 mg tablet Take 20 mg by mouth daily with breakfast    Historical Provider, MD           Review of Systems    Review of Systems   Constitutional: Negative for activity change, appetite change, chills, diaphoresis, fatigue and fever  HENT: Positive for dental problem  Negative for congestion, ear discharge, ear pain, hearing loss and sinus pressure  Eyes: Negative for photophobia, pain and visual disturbance  Respiratory: Negative for cough and shortness of breath  Cardiovascular: Negative for chest pain and palpitations  Gastrointestinal: Negative for abdominal pain  Musculoskeletal: Positive for arthralgias (left hand)  Negative for neck pain and neck stiffness  Skin: Positive for wound  Negative for rash  Neurological: Positive for headaches  Negative for dizziness, tremors, focal weakness, seizures, syncope, facial asymmetry, speech difficulty, weakness, light-headedness and numbness  Psychiatric/Behavioral: The patient is not nervous/anxious  All other systems reviewed and are negative      All other systems reviewed and negative  Physical Exam      ED Triage Vitals [01/04/20 2147]   Temperature Pulse Respirations Blood Pressure SpO2   98 1 °F (36 7 °C) 82 18 130/61 95 %      Temp Source Heart Rate Source Patient Position - Orthostatic VS BP Location FiO2 (%)   Oral Monitor Sitting Left arm --      Pain Score       1               Physical Exam   Constitutional: She is oriented to person, place, and time  She appears well-developed and well-nourished  No distress  HENT:   Head: Normocephalic  Head is with laceration  Head is without right periorbital erythema and without left periorbital erythema  Right Ear: External ear normal    Left Ear: External ear normal    Nose: Sinus tenderness and nasal deformity present  Epistaxis (R nare with controlled bleeding) is observed  No foreign bodies  Right sinus exhibits no maxillary sinus tenderness and no frontal sinus tenderness  Left sinus exhibits no maxillary sinus tenderness and no frontal sinus tenderness  Mouth/Throat: Uvula is midline and oropharynx is clear and moist  Abnormal dentition  Eyes: Pupils are equal, round, and reactive to light  Conjunctivae and EOM are normal    Neck: Normal range of motion  Neck supple  Cardiovascular: Normal rate, regular rhythm, normal heart sounds and intact distal pulses  Exam reveals no gallop and no friction rub  Pulmonary/Chest: Effort normal and breath sounds normal  No stridor  No respiratory distress  She has no wheezes  She has no rales  She exhibits no tenderness  Musculoskeletal: Normal range of motion          Right shoulder: Normal         Left shoulder: Normal         Right elbow: Normal        Left elbow: Normal         Right wrist: Normal         Left wrist: Normal         Right hip: Normal         Left hip: Normal         Right knee: Normal         Left knee: Normal         Right ankle: Normal         Left ankle: Normal         Cervical back: Normal         Thoracic back: Normal         Lumbar back: Normal         Right forearm: Normal         Left forearm: Normal         Right hand: Normal         Left hand: She exhibits tenderness  She exhibits normal range of motion, no bony tenderness, normal two-point discrimination, normal capillary refill, no deformity, no laceration and no swelling  Normal sensation noted  Normal strength noted  Hands:       Right foot: Normal         Left foot: Normal    Neurological: She is alert and oriented to person, place, and time  Skin: Skin is warm  Capillary refill takes less than 2 seconds  She is not diaphoretic  Nursing note and vitals reviewed            Diagnostic Results      Labs:    Results for orders placed or performed in visit on 03/07/19   Cancer antigen 19-9   Result Value Ref Range    CA 19-9 7 0 - 35 U/mL   CEA   Result Value Ref Range    CEA 2 6 0 0 - 3 0 ng/mL   CBC and differential   Result Value Ref Range    WBC 6 48 4 31 - 10 16 Thousand/uL    RBC 4 28 3 81 - 5 12 Million/uL    Hemoglobin 13 2 11 5 - 15 4 g/dL    Hematocrit 40 5 34 8 - 46 1 %    MCV 95 82 - 98 fL    MCH 30 8 26 8 - 34 3 pg    MCHC 32 6 31 4 - 37 4 g/dL    RDW 12 8 11 6 - 15 1 %    MPV 9 4 8 9 - 12 7 fL    Platelets 226 438 - 044 Thousands/uL    nRBC 0 /100 WBCs    Neutrophils Relative 51 43 - 75 %    Immat GRANS % 0 0 - 2 %    Lymphocytes Relative 34 14 - 44 %    Monocytes Relative 13 (H) 4 - 12 %    Eosinophils Relative 2 0 - 6 %    Basophils Relative 0 0 - 1 %    Neutrophils Absolute 3 31 1 85 - 7 62 Thousands/µL    Immature Grans Absolute 0 01 0 00 - 0 20 Thousand/uL    Lymphocytes Absolute 2 22 0 60 - 4 47 Thousands/µL    Monocytes Absolute 0 81 0 17 - 1 22 Thousand/µL    Eosinophils Absolute 0 12 0 00 - 0 61 Thousand/µL    Basophils Absolute 0 01 0 00 - 0 10 Thousands/µL   Comprehensive metabolic panel   Result Value Ref Range    Sodium 143 136 - 145 mmol/L    Potassium 3 8 3 5 - 5 3 mmol/L    Chloride 102 100 - 108 mmol/L    CO2 33 (H) 21 - 32 mmol/L    ANION GAP 8 4 - 13 mmol/L    BUN 17 5 - 25 mg/dL    Creatinine 0 98 0 60 - 1 30 mg/dL    Glucose 135 65 - 140 mg/dL    Calcium 9 1 8 3 - 10 1 mg/dL    AST 19 5 - 45 U/L    ALT 30 12 - 78 U/L    Alkaline Phosphatase 95 46 - 116 U/L    Total Protein 7 3 6 4 - 8 2 g/dL    Albumin 3 7 3 5 - 5 0 g/dL    Total Bilirubin 1 00 0 20 - 1 00 mg/dL    eGFR 57 ml/min/1 73sq m       All labs reviewed and utilized in the medical decision making process    Radiology:    XR hand 3+ views LEFT   ED Interpretation   No acute fracture/dislocation      Final Result      No acute osseous abnormality  Workstation performed: [id number]         CT head without contrast   Final Result      No acute intracranial abnormality  Workstation performed: [id number]         CT facial bones without contrast   Final Result      Acute appearing nondisplaced left nasal bone fracture  Workstation performed: [id number]             All radiology studies independently viewed by me and interpreted by the radiologist     Procedure    Laceration repair  Date/Time: 1/5/2020 12:59 PM  Performed by: Napoleon Celaya PA-C  Authorized by: Napoleon Celaya PA-C   Consent: Verbal consent obtained  Risks and benefits: risks, benefits and alternatives were discussed  Consent given by: patient  Patient understanding: patient states understanding of the procedure being performed  Patient identity confirmed: verbally with patient, arm band and hospital-assigned identification number  Body area: head/neck  Location details: forehead  Laceration length: 5 cm  Foreign bodies: no foreign bodies  Tendon involvement: none  Nerve involvement: none  Vascular damage: no  Anesthesia: local infiltration    Anesthesia:  Local Anesthetic: lidocaine 1% without epinephrine  Anesthetic total: 6 mL    Sedation:  Patient sedated: no        Procedure Details:  Preparation: Patient was prepped and draped in the usual sterile fashion    Irrigation solution: saline  Irrigation method: jet lavage  Amount of cleaning: extensive  Debridement: none  Degree of undermining: none  Wound skin closure material used: 9 fast absorbing gut  Subcutaneous closure: 5-0 Vicryl  Number of sutures: 9 catgut sutures superficial, 3 deep dermal vicryl for 5 cm laceration L eyebrow  1 cm lip lac 1 deep dermal vicryl  Technique: complex and simple  Approximation: close  Approximation difficulty: complex  Dressing: antibiotic ointment  Patient tolerance: Patient tolerated the procedure well with no immediate complications    Laceration repair  Date/Time: 1/5/2020 12:59 PM  Performed by: Fartun Hopkins PA-C  Authorized by: Fartun Hopkins PA-C   Consent: Verbal consent obtained  Risks and benefits: risks, benefits and alternatives were discussed  Consent given by: patient  Patient understanding: patient states understanding of the procedure being performed  Patient identity confirmed: verbally with patient, arm band and hospital-assigned identification number  Body area: head/neck  Location details: upper lip  Full thickness lip laceration: no  Vermilion border involved: yes  Lip laceration height: more than half vertical height  Laceration length: 1 5 cm  Foreign bodies: no foreign bodies  Tendon involvement: none  Nerve involvement: none  Vascular damage: no  Anesthesia: local infiltration    Anesthesia:  Local Anesthetic: lidocaine 1% without epinephrine  Anesthetic total: 2 mL      Procedure Details:  Preparation: Patient was prepped and draped in the usual sterile fashion  Irrigation solution: saline  Irrigation method: jet lavage  Amount of cleaning: extensive  Debridement: none  Degree of undermining: none  Skin closure: 5-0 nylon  Subcutaneous closure: 5-0 Vicryl  Number of sutures: 5 nylon, 1 vicryl    Technique: simple  Approximation: close  Approximation difficulty: complex  Lip approximation: vermillion border well aligned  Dressing: antibiotic ointment  Patient tolerance: Patient tolerated the procedure well with no immediate complications            Assessment and Plan    MDM  Number of Diagnoses or Management Options  Closed nondisplaced fracture of nasal bone, initial encounter: new, needed workup  Epistaxis due to trauma: new, needed workup  Facial laceration, initial encounter: new, needed workup  Injury of head, initial encounter: new, needed workup  Injury of left hand, initial encounter: new, needed workup  Lip laceration, initial encounter: new, needed workup     Amount and/or Complexity of Data Reviewed  Tests in the radiology section of CPT®: reviewed and ordered  Obtain history from someone other than the patient: yes  Review and summarize past medical records: yes  Independent visualization of images, tracings, or specimens: yes    Risk of Complications, Morbidity, and/or Mortality  Presenting problems: moderate  Diagnostic procedures: moderate  Management options: moderate    Patient Progress  Patient progress: improved      Initial ED assessment:  Yeny Jackson is a 66 y o  female with significant PMH for A Fib on Xarelto who presents with Facial injury, Fall  Vitals signs reviewed  Physical examination remarkable for lacerations face, lip, ttp L hand  Initial Ddx  includes but is not limited to:    intracranial injury, concussion, scalp laceration, cervical injury; intrathoracic injury, intraabdominal injury, extremity injury or child abuse  Initial ED plan:   Plan will be to perform diagnostic testing of CT head, XR hand and treat symptomatically  Final ED summary/disposition: Discussed results of diagnostic testing with pt and daughter in detail  Home care recommendations given with discharge paperwork  Return to ED instructions given if new/worsening sxs      MDM  Reviewed: previous chart, nursing note and vitals  Interpretation: x-ray and CT scan        ED Course of Care and Re-Assessments    ED Course as of Jan 08 0602   Sun Jan 05, 2020   0037 9 micah sutures superficial, 3 deep dermal vicryl for 5 cm laceration L eyebrow  1 cm lip lac 1 deep dermal vicryl, 5 6-0 nylon superficial                            Medications   tetanus-diphtheria-acellular pertussis (BOOSTRIX) IM injection 0 5 mL (0 5 mL Intramuscular Given 1/4/20 2311)   lidocaine (LMX) 4 % cream ( Topical Given 1/4/20 2227)   lidocaine (PF) (XYLOCAINE-MPF) 1 % injection 10 mL (10 mL Infiltration Given 1/4/20 2227)         FINAL IMPRESSION    Final diagnoses:   Injury of head, initial encounter   Facial laceration, initial encounter   Lip laceration, initial encounter   Injury of left hand, initial encounter   Closed nondisplaced fracture of nasal bone, initial encounter   Epistaxis due to trauma         DISPOSITION/PLAN  Time reflects when diagnosis was documented in both MDM as applicable and the Disposition within this note     Time User Action Codes Description Comment    1/5/2020 12:33 AM Sherry Shores Add [S09 90XA] Injury of head, initial encounter     1/5/2020 12:33 AM Sherry Shores Add [S01 81XA] Facial laceration, initial encounter     1/5/2020 12:34 AM Sherry Shores Add [S01 511A] Lip laceration, initial encounter     1/5/2020 12:34 AM Sherry Shores Add [S69 92XA] Injury of left hand, initial encounter     1/5/2020 12:34 AM Sherry Shores Add [S02  2XXA] Closed nondisplaced fracture of nasal bone, initial encounter     1/5/2020 12:34 AM Sherry Piñas Add [R04 0] Epistaxis due to trauma       ED Disposition     ED Disposition Condition Date/Time Comment    Discharge Stable Sun Jan 5, 2020 12:33 AM Anni Smith Quick discharge to home/self care  Follow-up Information     Follow up With Specialties Details Why Contact Info Additional [address] MD Internal Medicine Go to  For follow up [phone]  [address] Emergency Department Emergency Medicine Go to  If symptoms worsen, otherwise for suture removal in 5-7 days [address]  [phone] [facility], [address], [place], [place], [address]    [facility] and [address]  Call  For follow up Offices of Andra Merritt MD and Hamlet Gilliam MD    [address] [facility] [address], [address]   Phone: [phone]               PATIENT REFERRED TO:    Vika Kruger MD  [address] [facility] [address]  [phone]    Go to   For follow up    [address] Emergency [address]  [phone]  Go to   If symptoms worsen, otherwise for suture removal in 5-7 days    [facility] and [address] of Andra Merritt MD and Hamlet Gilliam, [facility] [address], [address]   Phone: [phone]  Call   For follow up      [address]:    Discharge Medication List as of 1/5/2020 12:36 AM      CONTINUE these medications which have NOT CHANGED    Details   calcium carbonate (OS-ANALISA) 600 MG tablet Take 600 mg by mouth 2 (two) times a day with meals, Historical Med      diltiazem (CARDIZEM) 90 mg tablet Take 180 mg by mouth daily, Historical Med      levothyroxine 100 mcg tablet Take 88 mcg by mouth daily, Historical Med      multivitamin (THERAGRAN) TABS Take 1 tablet by mouth daily, Historical Med      rivaroxaban (XARELTO) 20 mg tablet Take 20 mg by mouth daily with breakfast, Historical Med             No discharge procedures on file           SERGEI Manuel PA-C  01/08/20 5970

## 2020-01-14 ENCOUNTER — HOSPITAL ENCOUNTER (OUTPATIENT)
Dept: MRI IMAGING | Facility: HOSPITAL | Age: 75
Discharge: HOME | End: 2020-01-14
Attending: SURGERY
Payer: MEDICARE

## 2020-01-14 DIAGNOSIS — K86.2 PANCREATIC CYST: ICD-10-CM

## 2020-01-14 PROCEDURE — A9585 GADOBUTROL INJECTION: HCPCS | Performed by: SURGERY

## 2020-01-14 PROCEDURE — 74183 MRI ABD W/O CNTR FLWD CNTR: CPT

## 2020-01-14 RX ADMIN — GADOBUTROL 6 ML: 604.72 INJECTION INTRAVENOUS at 11:37

## 2020-01-21 ENCOUNTER — OFFICE VISIT (OUTPATIENT)
Dept: HEMATOLOGY ONCOLOGY | Facility: CLINIC | Age: 75
End: 2020-01-21
Payer: MEDICARE

## 2020-01-21 VITALS
SYSTOLIC BLOOD PRESSURE: 118 MMHG | OXYGEN SATURATION: 93 % | WEIGHT: 148 LBS | HEIGHT: 62 IN | HEART RATE: 86 BPM | DIASTOLIC BLOOD PRESSURE: 68 MMHG | BODY MASS INDEX: 27.23 KG/M2 | TEMPERATURE: 98.8 F | RESPIRATION RATE: 18 BRPM

## 2020-01-21 DIAGNOSIS — Z17.0 MALIGNANT NEOPLASM OF RIGHT BREAST IN FEMALE, ESTROGEN RECEPTOR POSITIVE, UNSPECIFIED SITE OF BREAST (HCC): Primary | ICD-10-CM

## 2020-01-21 DIAGNOSIS — C50.911 MALIGNANT NEOPLASM OF RIGHT BREAST IN FEMALE, ESTROGEN RECEPTOR POSITIVE, UNSPECIFIED SITE OF BREAST (HCC): Primary | ICD-10-CM

## 2020-01-21 PROCEDURE — 99214 OFFICE O/P EST MOD 30 MIN: CPT | Performed by: INTERNAL MEDICINE

## 2020-01-21 NOTE — PROGRESS NOTES
Hematology / Oncology Outpatient Follow Up Note    Gunnar Tong 66 y o  female OLS:[birth year] REARDON:[id number]         Date:  1/21/2020    Assessment / Plan:  A 66year old postmenopausal woman with stage IA right breast cancer, grade 1, ER/ME positive HER-2 negative disease  She completed 5 years of adjuvant hormonal therapy with anastrozole in September 2014  She has no evidence recurrent disease, based on her symptomatology and physical examinations  I am going to set up bilateral screening mammography  Once obtain report, I will contact her  Otherwise, I will see her again in a year for routine follow-up  She is in agreement with my recommendations        Subjective:      HPI:             Interval History:  A 66year old postmenopausal woman with stage IA right breast cancer, grade 1, ER/ME positive HER-2 negative disease diagnosed in May 2009  She underwent lumpectomy resulting in the WILLOW followed by adjuvant hormonal therapy with anastrozole  She came in today for routine followup  She completed 5 years of adjuvant hormonal therapy with anastrozole in September 2014  She presents today for routine follow-up  She continued to do well  She denied any pain  Her weight is stable  She has no respiratory symptoms  She has atrial fibrillation for which she takes rivaroxaban  She has no bleeding symptoms  Her performance status is normal         Objective:      Primary Diagnosis:     Right breast cancer, stage IA (pT1c, pN0, M0) grade 1, ER/ME positive, HER2 negative disease, diagnosed in May of 2009       Cancer Staging:  Cancer Staging  No matching staging information was found for the patient         Previous Hematologic/ Oncologic Treatment:      Adjuvant hormonal therapy with anastrozole 1 mg once a day from September 2009 through September 2014       Current Hematologic/ Oncologic Treatment:       Observation      Disease Status:      WILLOW status post lumpectomy with sentinel lymph node biopsy     Test Results:     Pathology:           Radiology:     Bilateral mammography in April 2019 was benign  BI-RADS 2       Laboratory:           Physical Exam:        General Appearance:    Alert, oriented          Eyes:    PERRL   Ears:    Normal external ear canals, both ears   Nose:   Nares normal, septum midline   Throat:   Mucosa moist  Pharynx without injection  Neck:   Supple         Lungs:     Clear to auscultation bilaterally   Chest Wall:    No tenderness or deformity    Heart:    Regular rate and rhythm         Abdomen:     Soft, non-tender, bowel sounds +, no organomegaly               Extremities:   Extremities no cyanosis or edema         Skin:   no rash or icterus  Lymph nodes:   Cervical, supraclavicular, and axillary nodes normal   Neurologic:   CNII-XII intact, normal strength, sensation and reflexes     Throughout             Breast exam:   lumpectomy scar in the outer upper quadrant of the right breast with no palpable mass or nodule  The left breast is negative              ROS: Review of Systems   All other systems reviewed and are negative  Imaging: Xr Hand 3+ Views Left    Result Date: 1/5/2020  Narrative: LEFT HAND INDICATION:   injury  Lateral bruising COMPARISON:  None VIEWS:  XR HAND 3+ VW LEFT For the purposes of institution wide universal language the following terms will apply: (thumb=1st digit/finger, index finger=2nd digit/finger, long finger=3rd digit/finger, ring=4th digit/finger and small finger=5th digit/finger) FINDINGS: There is no acute fracture or dislocation  Minimal degenerative changes  No lytic or blastic lesions are seen  Soft tissues are unremarkable  Impression: No acute osseous abnormality  Workstation performed: [id number]     Ct Head Without Contrast    Result Date: 1/4/2020  Narrative: CT BRAIN - WITHOUT CONTRAST INDICATION:   fall  COMPARISON:  None  TECHNIQUE:  CT examination of the brain was performed    In addition to axial images, coronal 2D reformatted images were created and submitted for interpretation  Radiation dose length product (DLP) for this visit:  860 mGy-cm   This examination, like all CT scans performed in the [facility], was performed utilizing techniques to minimize radiation dose exposure, including the use of iterative reconstruction and automated exposure control  IMAGE QUALITY:  Diagnostic  FINDINGS: PARENCHYMA:  No intracranial mass, mass effect or midline shift  No CT signs of acute infarction  No acute parenchymal hemorrhage  There is mild periventricular white matter low attenuation which is nonspecific and most likely related to chronic small vessel ischemic changes  VENTRICLES AND EXTRA-AXIAL SPACES:  Normal for the patient's age  VISUALIZED ORBITS AND PARANASAL SINUSES:  Unremarkable  CALVARIUM AND EXTRACRANIAL SOFT TISSUES:  There is mild left frontal scalp soft tissue swelling  The calvarium is intact  Impression: No acute intracranial abnormality  Workstation performed: [id number]     Ct Facial Bones Without Contrast    Result Date: 1/4/2020  Narrative: CT FACIAL BONES WITHOUT INTRAVENOUS CONTRAST INDICATION:   fall, on xarelto  COMPARISON: None  TECHNIQUE:  Axial CT images were obtained through the facial bones with additional sagittal and coronal reconstructions  Radiation dose length product (DLP) for this visit:  475 mGy-cm   This examination, like all CT scans performed in the [facility], was performed utilizing techniques to minimize radiation dose exposure, including the use of iterative reconstruction and automated exposure control  IMAGE QUALITY:  Diagnostic  FINDINGS: FACIAL BONES:  There is an acute appearing nondisplaced left nasal bone fracture  Normal alignment of the temporomandibular joints  No lytic or blastic lesion   ORBITS:  Orbital globes, optic nerves, and extraocular muscles appear symmetric and normal  There is no evidence of retrobulbar mass, abscess, or hematoma  SINUSES:  Normal  SOFT TISSUES:  There is mild left frontal scalp soft tissue swelling  There is mild left maxillary soft tissue swelling  Impression: Acute appearing nondisplaced left nasal bone fracture  Workstation performed: [id number]     Mri Abdomen W Wo Contrast And Mrcp    Result Date: 1/15/2020  Narrative: MRI OF THE ABDOMEN WITH AND WITHOUT CONTRAST WITH MRCP INDICATION: COMPARISON: MRI abdomen 3/2/2018  CT abdomen pelvis 3/11/2019  TECHNIQUE:  The following pulse sequences were obtained:  Coronal and axial T2 with TE of 90 and 180 respectively, axial T2 with fat saturation, axial FIESTA fat-sat, axial T1-weighted in-and-out-of phase, axial DWI/ADC, pre-contrast axial T1 with fat saturation, post-contrast dynamic axial T1 with fat saturation at 20, 70, and 180 seconds, followed by coronal and 7 minute delayed axial T1 with fat saturation  3D MRCP images were obtained with radial thick slabs and projections  3D rendering was performed from the acquisition scanner  IV Contrast:  6 mL of gadobutrol injection (MULTI-DOSE) FINDINGS: PANCREAS:  General: Normal in morphology and signal intensity  Lesions: Pancreatic body cyst is now decreased in size from the most recent CT imaging when it measured 14 mm with current maximum dimension of 7 mm  Duct: Main pancreatic duct and side-branches are normal in appearance  LOWER CHEST:   Unremarkable  LIVER: Normal in size and configuration  No suspicious mass  The hepatic veins and portal veins are patent  BILE DUCTS:  No intrahepatic or extrahepatic bile duct dilation  Common bile duct is normal in caliber  No choledocholithiasis, biliary stricture or suspicious mass  GALLBLADDER:  Normal  ADRENAL GLANDS:  Normal  SPLEEN:  Normal  KIDNEYS/PROXIMAL URETERS:  No hydroureteronephrosis  No suspicious renal mass  Simple renal cysts  BOWEL:   No dilated loops of bowel  PERITONEUM/RETROPERITONEUM:  No ascites  LYMPH NODES:  No abdominal lymphadenopathy  VASCULAR STRUCTURES:  No aneurysm  ABDOMINAL WALL:  Unremarkable  OSSEOUS STRUCTURES:  No suspicious osseous lesion  Impression: The 7 mm mid pancreatic body cyst is now decreased in size from most recent imaging dimensions of 14 mm on CT from 3/11/2019  Follow-up with repeat MRI in 2 years  Workstation performed: [id number]         Labs:   Lab Results   Component Value Date    WBC 6 48 03/07/2019    HGB 13 2 03/07/2019    HCT 40 5 03/07/2019    MCV 95 03/07/2019     03/07/2019     Lab Results   Component Value Date     05/26/2015    K 3 8 03/07/2019     03/07/2019    CO2 33 (H) 03/07/2019    ANIONGAP 7 05/26/2015    BUN 17 03/07/2019    CREATININE 0 98 03/07/2019    GLUCOSE 92 05/26/2015    GLUF 105 (H) 04/26/2018    CALCIUM 9 1 03/07/2019    AST 19 03/07/2019    ALT 30 03/07/2019    ALKPHOS 95 03/07/2019    PROT 7 2 05/26/2015    BILITOT 0 8 05/26/2015    EGFR 57 03/07/2019         Lab Results   Component Value Date    CEA 2 6 03/07/2019         Current Medications: Reviewed  Allergies: Reviewed  PMH/FH/SH:  Reviewed      Vital Sign:    Body surface area is 1 68 meters squared      Wt Readings from Last 3 Encounters:   01/21/20 67 1 kg (148 lb)   01/10/20 67 6 kg (149 lb)   01/04/20 67 6 kg (149 lb)        Temp Readings from Last 3 Encounters:   01/21/20 98 8 °F (37 1 °C) (Tympanic Core)   01/10/20 98 2 °F (36 8 °C) (Oral)   01/04/20 98 1 °F (36 7 °C) (Oral)        BP Readings from Last 3 Encounters:   01/21/20 118/68   01/10/20 119/55   01/04/20 (!) 100/49         Pulse Readings from Last 3 Encounters:   01/21/20 86   01/10/20 84   01/04/20 71     @LASTSAO2(3)@

## 2020-07-02 ENCOUNTER — HOSPITAL ENCOUNTER (OUTPATIENT)
Dept: RADIOLOGY | Age: 75
Discharge: HOME | End: 2020-07-02
Attending: INTERNAL MEDICINE
Payer: MEDICARE

## 2020-07-02 ENCOUNTER — TELEPHONE (OUTPATIENT)
Dept: HEMATOLOGY ONCOLOGY | Facility: CLINIC | Age: 75
End: 2020-07-02

## 2020-07-02 VITALS — BODY MASS INDEX: 27.23 KG/M2 | HEIGHT: 62 IN | WEIGHT: 148 LBS

## 2020-07-02 DIAGNOSIS — C50.911 MALIGNANT NEOPLASM OF RIGHT BREAST IN FEMALE, ESTROGEN RECEPTOR POSITIVE, UNSPECIFIED SITE OF BREAST (HCC): ICD-10-CM

## 2020-07-02 DIAGNOSIS — Z12.31 ENCOUNTER FOR SCREENING MAMMOGRAM FOR MALIGNANT NEOPLASM OF BREAST: ICD-10-CM

## 2020-07-02 DIAGNOSIS — Z17.0 MALIGNANT NEOPLASM OF RIGHT BREAST IN FEMALE, ESTROGEN RECEPTOR POSITIVE, UNSPECIFIED SITE OF BREAST (HCC): ICD-10-CM

## 2020-07-02 PROCEDURE — 77067 SCR MAMMO BI INCL CAD: CPT

## 2020-07-02 PROCEDURE — 77063 BREAST TOMOSYNTHESIS BI: CPT

## 2020-07-02 NOTE — TELEPHONE ENCOUNTER
Patient is calling in to confirm that her mammogram order is not  I have confirmed that it is still good      Patient voiced understanding

## 2020-07-06 ENCOUNTER — TELEPHONE (OUTPATIENT)
Dept: HEMATOLOGY ONCOLOGY | Facility: CLINIC | Age: 75
End: 2020-07-06

## 2020-11-06 RX ORDER — TRIAMTERENE AND HYDROCHLOROTHIAZIDE 37.5; 25 MG/1; MG/1
1 CAPSULE ORAL EVERY MORNING
COMMUNITY

## 2020-11-10 DIAGNOSIS — Z20.822 COVID-19 RULED OUT BY LABORATORY TESTING: ICD-10-CM

## 2020-11-10 PROCEDURE — U0003 INFECTIOUS AGENT DETECTION BY NUCLEIC ACID (DNA OR RNA); SEVERE ACUTE RESPIRATORY SYNDROME CORONAVIRUS 2 (SARS-COV-2) (CORONAVIRUS DISEASE [COVID-19]), AMPLIFIED PROBE TECHNIQUE, MAKING USE OF HIGH THROUGHPUT TECHNOLOGIES AS DESCRIBED BY CMS-2020-01-R: HCPCS | Performed by: OPHTHALMOLOGY

## 2020-11-12 LAB — SARS-COV-2 RNA SPEC QL NAA+PROBE: NOT DETECTED

## 2020-11-15 ENCOUNTER — ANESTHESIA EVENT (OUTPATIENT)
Dept: PERIOP | Facility: AMBULARY SURGERY CENTER | Age: 75
End: 2020-11-15
Payer: MEDICARE

## 2020-11-16 ENCOUNTER — ANESTHESIA (OUTPATIENT)
Dept: PERIOP | Facility: AMBULARY SURGERY CENTER | Age: 75
End: 2020-11-16
Payer: MEDICARE

## 2020-11-16 ENCOUNTER — HOSPITAL ENCOUNTER (OUTPATIENT)
Facility: AMBULARY SURGERY CENTER | Age: 75
Setting detail: OUTPATIENT SURGERY
Discharge: HOME | End: 2020-11-16
Attending: OPHTHALMOLOGY | Admitting: OPHTHALMOLOGY
Payer: MEDICARE

## 2020-11-16 VITALS — HEART RATE: 68 BPM

## 2020-11-16 VITALS
DIASTOLIC BLOOD PRESSURE: 57 MMHG | HEART RATE: 74 BPM | TEMPERATURE: 97.6 F | SYSTOLIC BLOOD PRESSURE: 106 MMHG | WEIGHT: 153 LBS | BODY MASS INDEX: 27.98 KG/M2 | OXYGEN SATURATION: 95 % | RESPIRATION RATE: 18 BRPM

## 2020-11-16 DIAGNOSIS — H25.11 AGE-RELATED NUCLEAR CATARACT OF RIGHT EYE: ICD-10-CM

## 2020-11-16 DIAGNOSIS — Z20.822 COVID-19 RULED OUT BY LABORATORY TESTING: Primary | ICD-10-CM

## 2020-11-16 PROCEDURE — V2632 POST CHMBR INTRAOCULAR LENS: HCPCS | Performed by: OPHTHALMOLOGY

## 2020-11-16 DEVICE — IOL SN60WF 24.5: Type: IMPLANTABLE DEVICE | Site: EYE | Status: FUNCTIONAL

## 2020-11-16 RX ORDER — CYCLOPENTOLATE HYDROCHLORIDE 10 MG/ML
1 SOLUTION/ DROPS OPHTHALMIC
Status: COMPLETED | OUTPATIENT
Start: 2020-11-16 | End: 2020-11-16

## 2020-11-16 RX ORDER — GATIFLOXACIN 5 MG/ML
1 SOLUTION/ DROPS OPHTHALMIC 2 TIMES DAILY
Qty: 3 ML | Refills: 0
Start: 2020-11-16 | End: 2020-12-07

## 2020-11-16 RX ORDER — BALANCED SALT SOLUTION 6.4; .75; .48; .3; 3.9; 1.7 MG/ML; MG/ML; MG/ML; MG/ML; MG/ML; MG/ML
SOLUTION OPHTHALMIC AS NEEDED
Status: DISCONTINUED | OUTPATIENT
Start: 2020-11-16 | End: 2020-11-16 | Stop reason: HOSPADM

## 2020-11-16 RX ORDER — MIDAZOLAM HYDROCHLORIDE 2 MG/2ML
INJECTION, SOLUTION INTRAMUSCULAR; INTRAVENOUS AS NEEDED
Status: DISCONTINUED | OUTPATIENT
Start: 2020-11-16 | End: 2020-11-16

## 2020-11-16 RX ORDER — LIDOCAINE HYDROCHLORIDE 10 MG/ML
INJECTION, SOLUTION EPIDURAL; INFILTRATION; INTRACAUDAL; PERINEURAL AS NEEDED
Status: DISCONTINUED | OUTPATIENT
Start: 2020-11-16 | End: 2020-11-16 | Stop reason: HOSPADM

## 2020-11-16 RX ORDER — GATIFLOXACIN 5 MG/ML
SOLUTION/ DROPS OPHTHALMIC AS NEEDED
Status: DISCONTINUED | OUTPATIENT
Start: 2020-11-16 | End: 2020-11-16 | Stop reason: HOSPADM

## 2020-11-16 RX ORDER — PHENYLEPHRINE HCL 2.5 %
1 DROPS OPHTHALMIC (EYE)
Status: COMPLETED | OUTPATIENT
Start: 2020-11-16 | End: 2020-11-16

## 2020-11-16 RX ORDER — KETOROLAC TROMETHAMINE 5 MG/ML
1 SOLUTION OPHTHALMIC 4 TIMES DAILY
Qty: 5 ML | Refills: 0
Start: 2020-11-16 | End: 2020-12-07

## 2020-11-16 RX ORDER — LIDOCAINE HYDROCHLORIDE 20 MG/ML
1 JELLY TOPICAL
Status: COMPLETED | OUTPATIENT
Start: 2020-11-16 | End: 2020-11-16

## 2020-11-16 RX ORDER — TETRACAINE HYDROCHLORIDE 5 MG/ML
1 SOLUTION OPHTHALMIC ONCE
Status: COMPLETED | OUTPATIENT
Start: 2020-11-16 | End: 2020-11-16

## 2020-11-16 RX ORDER — TETRACAINE HYDROCHLORIDE 5 MG/ML
SOLUTION OPHTHALMIC AS NEEDED
Status: DISCONTINUED | OUTPATIENT
Start: 2020-11-16 | End: 2020-11-16 | Stop reason: HOSPADM

## 2020-11-16 RX ORDER — KETOROLAC TROMETHAMINE 5 MG/ML
1 SOLUTION OPHTHALMIC
Status: COMPLETED | OUTPATIENT
Start: 2020-11-16 | End: 2020-11-16

## 2020-11-16 RX ORDER — ONDANSETRON 2 MG/ML
4 INJECTION INTRAMUSCULAR; INTRAVENOUS ONCE AS NEEDED
Status: DISCONTINUED | OUTPATIENT
Start: 2020-11-16 | End: 2020-11-16 | Stop reason: HOSPADM

## 2020-11-16 RX ADMIN — KETOROLAC TROMETHAMINE 1 DROP: 5 SOLUTION OPHTHALMIC at 10:45

## 2020-11-16 RX ADMIN — TETRACAINE HYDROCHLORIDE 1 DROP: 5 SOLUTION OPHTHALMIC at 10:45

## 2020-11-16 RX ADMIN — PHENYLEPHRINE HYDROCHLORIDE 1 DROP: 25 SOLUTION/ DROPS OPHTHALMIC at 11:00

## 2020-11-16 RX ADMIN — MIDAZOLAM 1 MG: 1 INJECTION INTRAMUSCULAR; INTRAVENOUS at 11:37

## 2020-11-16 RX ADMIN — MIDAZOLAM 1 MG: 1 INJECTION INTRAMUSCULAR; INTRAVENOUS at 11:39

## 2020-11-16 RX ADMIN — CYCLOPENTOLATE HYDROCHLORIDE 1 DROP: 10 SOLUTION/ DROPS OPHTHALMIC at 10:45

## 2020-11-16 RX ADMIN — KETOROLAC TROMETHAMINE 1 DROP: 5 SOLUTION OPHTHALMIC at 11:30

## 2020-11-16 RX ADMIN — LIDOCAINE HYDROCHLORIDE: 20 JELLY TOPICAL at 11:30

## 2020-11-16 RX ADMIN — PHENYLEPHRINE HYDROCHLORIDE 1 DROP: 25 SOLUTION/ DROPS OPHTHALMIC at 11:30

## 2020-11-16 RX ADMIN — CYCLOPENTOLATE HYDROCHLORIDE 1 DROP: 10 SOLUTION/ DROPS OPHTHALMIC at 11:15

## 2020-11-16 RX ADMIN — PHENYLEPHRINE HYDROCHLORIDE 1 DROP: 25 SOLUTION/ DROPS OPHTHALMIC at 11:19

## 2020-11-16 RX ADMIN — LIDOCAINE HYDROCHLORIDE: 20 JELLY TOPICAL at 10:45

## 2020-11-16 RX ADMIN — LIDOCAINE HYDROCHLORIDE: 20 JELLY TOPICAL at 11:00

## 2020-11-16 RX ADMIN — PHENYLEPHRINE HYDROCHLORIDE 1 DROP: 25 SOLUTION/ DROPS OPHTHALMIC at 10:45

## 2020-11-16 RX ADMIN — KETOROLAC TROMETHAMINE 1 DROP: 5 SOLUTION OPHTHALMIC at 11:15

## 2020-11-16 RX ADMIN — CYCLOPENTOLATE HYDROCHLORIDE 1 DROP: 10 SOLUTION/ DROPS OPHTHALMIC at 11:30

## 2020-11-16 RX ADMIN — KETOROLAC TROMETHAMINE 1 DROP: 5 SOLUTION OPHTHALMIC at 11:00

## 2020-11-16 RX ADMIN — CYCLOPENTOLATE HYDROCHLORIDE 1 DROP: 10 SOLUTION/ DROPS OPHTHALMIC at 11:00

## 2020-12-08 DIAGNOSIS — Z20.822 COVID-19 RULED OUT BY LABORATORY TESTING: ICD-10-CM

## 2020-12-08 PROCEDURE — U0003 INFECTIOUS AGENT DETECTION BY NUCLEIC ACID (DNA OR RNA); SEVERE ACUTE RESPIRATORY SYNDROME CORONAVIRUS 2 (SARS-COV-2) (CORONAVIRUS DISEASE [COVID-19]), AMPLIFIED PROBE TECHNIQUE, MAKING USE OF HIGH THROUGHPUT TECHNOLOGIES AS DESCRIBED BY CMS-2020-01-R: HCPCS | Performed by: OPHTHALMOLOGY

## 2020-12-09 LAB — SARS-COV-2 RNA SPEC QL NAA+PROBE: NOT DETECTED

## 2020-12-14 ENCOUNTER — HOSPITAL ENCOUNTER (OUTPATIENT)
Facility: AMBULARY SURGERY CENTER | Age: 75
Setting detail: OUTPATIENT SURGERY
Discharge: HOME | End: 2020-12-14
Attending: OPHTHALMOLOGY | Admitting: OPHTHALMOLOGY
Payer: MEDICARE

## 2020-12-14 ENCOUNTER — ANESTHESIA EVENT (OUTPATIENT)
Dept: PERIOP | Facility: AMBULARY SURGERY CENTER | Age: 75
End: 2020-12-14
Payer: MEDICARE

## 2020-12-14 ENCOUNTER — ANESTHESIA (OUTPATIENT)
Dept: PERIOP | Facility: AMBULARY SURGERY CENTER | Age: 75
End: 2020-12-14
Payer: MEDICARE

## 2020-12-14 VITALS
SYSTOLIC BLOOD PRESSURE: 144 MMHG | HEART RATE: 71 BPM | HEIGHT: 62 IN | BODY MASS INDEX: 28.16 KG/M2 | RESPIRATION RATE: 18 BRPM | DIASTOLIC BLOOD PRESSURE: 100 MMHG | TEMPERATURE: 96 F | OXYGEN SATURATION: 97 % | WEIGHT: 153 LBS

## 2020-12-14 DIAGNOSIS — H25.12 AGE-RELATED NUCLEAR CATARACT OF LEFT EYE: ICD-10-CM

## 2020-12-14 DIAGNOSIS — Z20.822 COVID-19 RULED OUT BY LABORATORY TESTING: Primary | ICD-10-CM

## 2020-12-14 PROBLEM — J44.9 CHRONIC OBSTRUCTIVE PULMONARY DISEASE (HCC): Status: ACTIVE | Noted: 2020-12-14

## 2020-12-14 PROCEDURE — V2632 POST CHMBR INTRAOCULAR LENS: HCPCS | Performed by: OPHTHALMOLOGY

## 2020-12-14 DEVICE — ACRYSOF(R) IQ ASPHERIC NATURAL IOL, SINGLE-PIECE ACRYLIC FOLDABLE PCL, UV WITH BLUE LIGHTFILTER, 13.0MM LENGTH, 6.0MM ANTERIORASYMMETRIC BICONVEX OPTIC, PLANAR HAPTICS.
Type: IMPLANTABLE DEVICE | Status: FUNCTIONAL
Brand: ACRYSOF®

## 2020-12-14 RX ORDER — CIPROFLOXACIN HYDROCHLORIDE 3.5 MG/ML
1 SOLUTION/ DROPS TOPICAL 4 TIMES DAILY
Qty: 5 ML | Refills: 0
Start: 2020-12-14 | End: 2022-02-09

## 2020-12-14 RX ORDER — GATIFLOXACIN 5 MG/ML
SOLUTION/ DROPS OPHTHALMIC AS NEEDED
Status: DISCONTINUED | OUTPATIENT
Start: 2020-12-14 | End: 2020-12-14 | Stop reason: HOSPADM

## 2020-12-14 RX ORDER — MIDAZOLAM HYDROCHLORIDE 2 MG/2ML
INJECTION, SOLUTION INTRAMUSCULAR; INTRAVENOUS AS NEEDED
Status: DISCONTINUED | OUTPATIENT
Start: 2020-12-14 | End: 2020-12-14

## 2020-12-14 RX ORDER — CYCLOPENTOLATE HYDROCHLORIDE 10 MG/ML
1 SOLUTION/ DROPS OPHTHALMIC
Status: COMPLETED | OUTPATIENT
Start: 2020-12-14 | End: 2020-12-14

## 2020-12-14 RX ORDER — KETOROLAC TROMETHAMINE 5 MG/ML
1 SOLUTION OPHTHALMIC
Status: COMPLETED | OUTPATIENT
Start: 2020-12-14 | End: 2020-12-14

## 2020-12-14 RX ORDER — TETRACAINE HYDROCHLORIDE 5 MG/ML
1 SOLUTION OPHTHALMIC ONCE
Status: COMPLETED | OUTPATIENT
Start: 2020-12-14 | End: 2020-12-14

## 2020-12-14 RX ORDER — LIDOCAINE HYDROCHLORIDE 10 MG/ML
INJECTION, SOLUTION EPIDURAL; INFILTRATION; INTRACAUDAL; PERINEURAL AS NEEDED
Status: DISCONTINUED | OUTPATIENT
Start: 2020-12-14 | End: 2020-12-14 | Stop reason: HOSPADM

## 2020-12-14 RX ORDER — BALANCED SALT SOLUTION 6.4; .75; .48; .3; 3.9; 1.7 MG/ML; MG/ML; MG/ML; MG/ML; MG/ML; MG/ML
SOLUTION OPHTHALMIC AS NEEDED
Status: DISCONTINUED | OUTPATIENT
Start: 2020-12-14 | End: 2020-12-14 | Stop reason: HOSPADM

## 2020-12-14 RX ORDER — LIDOCAINE HYDROCHLORIDE 20 MG/ML
1 JELLY TOPICAL
Status: COMPLETED | OUTPATIENT
Start: 2020-12-14 | End: 2020-12-14

## 2020-12-14 RX ORDER — PHENYLEPHRINE HCL 2.5 %
1 DROPS OPHTHALMIC (EYE)
Status: COMPLETED | OUTPATIENT
Start: 2020-12-14 | End: 2020-12-14

## 2020-12-14 RX ORDER — ONDANSETRON 2 MG/ML
4 INJECTION INTRAMUSCULAR; INTRAVENOUS ONCE AS NEEDED
Status: CANCELLED | OUTPATIENT
Start: 2020-12-14

## 2020-12-14 RX ORDER — KETOROLAC TROMETHAMINE 5 MG/ML
1 SOLUTION OPHTHALMIC 4 TIMES DAILY
Qty: 5 ML | Refills: 0
Start: 2020-12-14 | End: 2021-02-10 | Stop reason: ALTCHOICE

## 2020-12-14 RX ORDER — TETRACAINE HYDROCHLORIDE 5 MG/ML
SOLUTION OPHTHALMIC AS NEEDED
Status: DISCONTINUED | OUTPATIENT
Start: 2020-12-14 | End: 2020-12-14 | Stop reason: HOSPADM

## 2020-12-14 RX ADMIN — KETOROLAC TROMETHAMINE 1 DROP: 5 SOLUTION OPHTHALMIC at 10:45

## 2020-12-14 RX ADMIN — LIDOCAINE HYDROCHLORIDE: 20 JELLY TOPICAL at 10:15

## 2020-12-14 RX ADMIN — KETOROLAC TROMETHAMINE 1 DROP: 5 SOLUTION OPHTHALMIC at 10:15

## 2020-12-14 RX ADMIN — CYCLOPENTOLATE HYDROCHLORIDE 1 DROP: 10 SOLUTION/ DROPS OPHTHALMIC at 10:15

## 2020-12-14 RX ADMIN — MIDAZOLAM 2 MG: 1 INJECTION INTRAMUSCULAR; INTRAVENOUS at 10:48

## 2020-12-14 RX ADMIN — KETOROLAC TROMETHAMINE 1 DROP: 5 SOLUTION OPHTHALMIC at 10:30

## 2020-12-14 RX ADMIN — LIDOCAINE HYDROCHLORIDE 1 APPLICATION: 20 JELLY TOPICAL at 10:30

## 2020-12-14 RX ADMIN — PHENYLEPHRINE HYDROCHLORIDE 1 DROP: 25 SOLUTION/ DROPS OPHTHALMIC at 10:45

## 2020-12-14 RX ADMIN — KETOROLAC TROMETHAMINE 1 DROP: 5 SOLUTION OPHTHALMIC at 10:00

## 2020-12-14 RX ADMIN — CYCLOPENTOLATE HYDROCHLORIDE 1 DROP: 10 SOLUTION/ DROPS OPHTHALMIC at 10:30

## 2020-12-14 RX ADMIN — CYCLOPENTOLATE HYDROCHLORIDE 1 DROP: 10 SOLUTION/ DROPS OPHTHALMIC at 10:00

## 2020-12-14 RX ADMIN — PHENYLEPHRINE HYDROCHLORIDE 1 DROP: 25 SOLUTION/ DROPS OPHTHALMIC at 10:15

## 2020-12-14 RX ADMIN — PHENYLEPHRINE HYDROCHLORIDE 1 DROP: 25 SOLUTION/ DROPS OPHTHALMIC at 10:00

## 2020-12-14 RX ADMIN — LIDOCAINE HYDROCHLORIDE 1 APPLICATION: 20 JELLY TOPICAL at 10:00

## 2020-12-14 RX ADMIN — TETRACAINE HYDROCHLORIDE 1 DROP: 5 SOLUTION OPHTHALMIC at 10:00

## 2020-12-14 RX ADMIN — PHENYLEPHRINE HYDROCHLORIDE 1 DROP: 25 SOLUTION/ DROPS OPHTHALMIC at 10:30

## 2020-12-14 RX ADMIN — CYCLOPENTOLATE HYDROCHLORIDE 1 DROP: 10 SOLUTION/ DROPS OPHTHALMIC at 10:45

## 2021-01-12 ENCOUNTER — TELEPHONE (OUTPATIENT)
Dept: HEMATOLOGY ONCOLOGY | Facility: CLINIC | Age: 76
End: 2021-01-12

## 2021-01-12 NOTE — TELEPHONE ENCOUNTER
Reschedule Appointment     Who is calling in  Patient    Doctor Appointment Scheduled with Dr Lakshmi Nunn date and time 01/26 at 1:00pm   New date and time 02/10 at 11:40am   Location [facility]   Patient verbalized understanding    Yes

## 2021-01-20 DIAGNOSIS — Z23 ENCOUNTER FOR IMMUNIZATION: ICD-10-CM

## 2021-01-24 ENCOUNTER — IMMUNIZATIONS (OUTPATIENT)
Dept: FAMILY MEDICINE CLINIC | Facility: HOSPITAL | Age: 76
End: 2021-01-24

## 2021-01-24 DIAGNOSIS — Z23 ENCOUNTER FOR IMMUNIZATION: Primary | ICD-10-CM

## 2021-01-24 PROCEDURE — 0011A SARS-COV-2 / COVID-19 MRNA VACCINE (MODERNA) 100 MCG: CPT

## 2021-01-24 PROCEDURE — 91301 SARS-COV-2 / COVID-19 MRNA VACCINE (MODERNA) 100 MCG: CPT

## 2021-02-09 ENCOUNTER — TELEPHONE (OUTPATIENT)
Dept: HEMATOLOGY ONCOLOGY | Facility: CLINIC | Age: 76
End: 2021-02-09

## 2021-02-10 ENCOUNTER — OFFICE VISIT (OUTPATIENT)
Dept: HEMATOLOGY ONCOLOGY | Facility: CLINIC | Age: 76
End: 2021-02-10
Payer: MEDICARE

## 2021-02-10 VITALS
BODY MASS INDEX: 28.44 KG/M2 | DIASTOLIC BLOOD PRESSURE: 72 MMHG | HEART RATE: 104 BPM | HEIGHT: 63 IN | RESPIRATION RATE: 18 BRPM | TEMPERATURE: 97.6 F | SYSTOLIC BLOOD PRESSURE: 124 MMHG | OXYGEN SATURATION: 95 % | WEIGHT: 160.5 LBS

## 2021-02-10 DIAGNOSIS — C50.911 MALIGNANT NEOPLASM OF RIGHT BREAST IN FEMALE, ESTROGEN RECEPTOR POSITIVE, UNSPECIFIED SITE OF BREAST (HCC): Primary | ICD-10-CM

## 2021-02-10 DIAGNOSIS — Z17.0 MALIGNANT NEOPLASM OF RIGHT BREAST IN FEMALE, ESTROGEN RECEPTOR POSITIVE, UNSPECIFIED SITE OF BREAST (HCC): Primary | ICD-10-CM

## 2021-02-10 DIAGNOSIS — Z12.31 ENCOUNTER FOR SCREENING MAMMOGRAM FOR MALIGNANT NEOPLASM OF BREAST: ICD-10-CM

## 2021-02-10 PROCEDURE — 99214 OFFICE O/P EST MOD 30 MIN: CPT | Performed by: INTERNAL MEDICINE

## 2021-02-10 RX ORDER — PREDNISOLONE ACETATE 10 MG/ML
SUSPENSION/ DROPS OPHTHALMIC
COMMUNITY
Start: 2020-12-09 | End: 2022-02-09

## 2021-02-10 RX ORDER — ALBUTEROL SULFATE 90 UG/1
2 AEROSOL, METERED RESPIRATORY (INHALATION) EVERY 4 HOURS PRN
COMMUNITY
Start: 2021-01-19

## 2021-02-10 RX ORDER — DILTIAZEM HYDROCHLORIDE 180 MG/1
180 CAPSULE, COATED, EXTENDED RELEASE ORAL DAILY
COMMUNITY
Start: 2021-01-30

## 2021-02-10 NOTE — PROGRESS NOTES
Hematology / Oncology Outpatient Follow Up Note    Leonardo Corado 66 y o  female DHA:[birth year] :[id number]         Date:  2/10/2021    Assessment / Plan:  A 63 year old postmenopausal woman with stage IA right breast cancer, grade 1, ER/OH positive HER-2 negative disease  She completed 5 years of adjuvant hormonal therapy with anastrozole in September 2014  She is currently on observation  Clinically, she has no evidence recurrent disease  I recommended her to continue with surveillance  I am going to set up bilateral screening mammography in July 2021  I am going to see her again in a year for routine follow-up  She is in agreement with my recommendations      Subjective:      HPI:             Interval History:  A 63 year old postmenopausal woman with stage IA right breast cancer, grade 1, ER/OH positive HER-2 negative disease diagnosed in May 2009  She underwent lumpectomy resulting in the WILLOW followed by adjuvant hormonal therapy with anastrozole  She came in today for routine followup  She completed 5 years of adjuvant hormonal therapy with anastrozole in September 2014  She presents today for routine follow-up  She feels very well with no new complaints  She denied bone pain  She has no respiratory symptoms  She is maintaining her weight  She had routine screening colonoscopy last months    Her performance status is normal       Objective:      Primary Diagnosis:     Right breast cancer, stage IA (pT1c, pN0, M0) grade 1, ER/OH positive, HER2 negative disease, diagnosed in May of 2009       Cancer Staging:  Cancer Staging  No matching staging information was found for the patient         Previous Hematologic/ Oncologic Treatment:      Adjuvant hormonal therapy with anastrozole 1 mg once a day from September 2009 through September 2014       Current Hematologic/ Oncologic Treatment:       Observation      Disease Status:      WILLOW status post lumpectomy with sentinel lymph node biopsy       Test Results:     Pathology:           Radiology:     Bilateral mammography in July 2020 was benign  BI-RADS 2       Laboratory:           Physical Exam:        General Appearance:    Alert, oriented          Eyes:    PERRL   Ears:    Normal external ear canals, both ears   Nose:   Nares normal, septum midline   Throat:   Mucosa moist  Pharynx without injection  Neck:   Supple         Lungs:     Clear to auscultation bilaterally   Chest Wall:    No tenderness or deformity    Heart:    Regular rate and rhythm         Abdomen:     Soft, non-tender, bowel sounds +, no organomegaly               Extremities:   Extremities no cyanosis or edema         Skin:   no rash or icterus  Lymph nodes:   Cervical, supraclavicular, and axillary nodes normal   Neurologic:   CNII-XII intact, normal strength, sensation and reflexes     Throughout             Breast exam:   lumpectomy scar in the outer upper quadrant of the right breast with no palpable mass or nodule  The left breast is negative              ROS: Review of Systems   All other systems reviewed and are negative  Imaging: No results found  Labs:   Lab Results   Component Value Date    WBC 6 48 03/07/2019    HGB 13 2 03/07/2019    HCT 40 5 03/07/2019    MCV 95 03/07/2019     03/07/2019     Lab Results   Component Value Date     05/26/2015    K 3 8 03/07/2019     03/07/2019    CO2 33 (H) 03/07/2019    ANIONGAP 7 05/26/2015    BUN 17 03/07/2019    CREATININE 0 98 03/07/2019    GLUCOSE 92 05/26/2015    GLUF 105 (H) 04/26/2018    CALCIUM 9 1 03/07/2019    AST 19 03/07/2019    ALT 30 03/07/2019    ALKPHOS 95 03/07/2019    PROT 7 2 05/26/2015    BILITOT 0 8 05/26/2015    EGFR 57 03/07/2019         Lab Results   Component Value Date    CEA 2 6 03/07/2019         Current Medications: Reviewed  Allergies: Reviewed  PMH/FH/SH:  Reviewed      Vital Sign:    Body surface area is 1 76 meters squared      Wt Readings from Last 3 Encounters:   02/10/21 72 8 kg (160 lb 8 oz)   12/14/20 69 4 kg (153 lb)   11/06/20 69 4 kg (153 lb)        Temp Readings from Last 3 Encounters:   02/10/21 97 6 °F (36 4 °C) (Tympanic Core)   12/14/20 (!) 96 °F (35 6 °C) (Temporal)   11/16/20 97 6 °F (36 4 °C) (Temporal)        BP Readings from Last 3 Encounters:   02/10/21 124/72   12/14/20 144/100   11/16/20 106/57         Pulse Readings from Last 3 Encounters:   02/10/21 104   12/14/20 71   12/14/20 (!) 0     @YFSXHUN4(1)@

## 2021-02-21 ENCOUNTER — IMMUNIZATIONS (OUTPATIENT)
Dept: FAMILY MEDICINE CLINIC | Facility: HOSPITAL | Age: 76
End: 2021-02-21

## 2021-02-21 DIAGNOSIS — Z23 ENCOUNTER FOR IMMUNIZATION: Primary | ICD-10-CM

## 2021-02-21 PROCEDURE — 91301 SARS-COV-2 / COVID-19 MRNA VACCINE (MODERNA) 100 MCG: CPT

## 2021-02-21 PROCEDURE — 0012A SARS-COV-2 / COVID-19 MRNA VACCINE (MODERNA) 100 MCG: CPT

## 2021-07-29 ENCOUNTER — HOSPITAL ENCOUNTER (OUTPATIENT)
Dept: MAMMOGRAPHY | Facility: HOSPITAL | Age: 76
Discharge: HOME | End: 2021-07-29
Attending: INTERNAL MEDICINE
Payer: MEDICARE

## 2021-07-29 VITALS — HEIGHT: 63 IN | BODY MASS INDEX: 28.35 KG/M2 | WEIGHT: 160 LBS

## 2021-07-29 DIAGNOSIS — Z12.31 ENCOUNTER FOR SCREENING MAMMOGRAM FOR MALIGNANT NEOPLASM OF BREAST: ICD-10-CM

## 2021-07-29 DIAGNOSIS — C50.911 MALIGNANT NEOPLASM OF RIGHT BREAST IN FEMALE, ESTROGEN RECEPTOR POSITIVE, UNSPECIFIED SITE OF BREAST (HCC): ICD-10-CM

## 2021-07-29 DIAGNOSIS — Z17.0 MALIGNANT NEOPLASM OF RIGHT BREAST IN FEMALE, ESTROGEN RECEPTOR POSITIVE, UNSPECIFIED SITE OF BREAST (HCC): ICD-10-CM

## 2021-07-29 PROCEDURE — 77067 SCR MAMMO BI INCL CAD: CPT

## 2021-07-29 PROCEDURE — 77063 BREAST TOMOSYNTHESIS BI: CPT

## 2021-07-30 ENCOUNTER — TELEPHONE (OUTPATIENT)
Dept: HEMATOLOGY ONCOLOGY | Facility: CLINIC | Age: 76
End: 2021-07-30

## 2022-01-25 ENCOUNTER — TELEPHONE (OUTPATIENT)
Dept: GASTROENTEROLOGY | Facility: CLINIC | Age: 77
End: 2022-01-25

## 2022-01-25 NOTE — TELEPHONE ENCOUNTER
Recall call went out in 2020 via talksoft with no return calls from pt to schedule  Pt is due for a colon with Dr Angela Carbajal for hx of tubular adenoma polyps  I called and spoke to pt whom informed that she already had colon done with Dr Kacie Reyes

## 2022-02-09 ENCOUNTER — OFFICE VISIT (OUTPATIENT)
Dept: HEMATOLOGY ONCOLOGY | Facility: CLINIC | Age: 77
End: 2022-02-09
Payer: MEDICARE

## 2022-02-09 VITALS
DIASTOLIC BLOOD PRESSURE: 76 MMHG | RESPIRATION RATE: 18 BRPM | HEART RATE: 83 BPM | OXYGEN SATURATION: 96 % | SYSTOLIC BLOOD PRESSURE: 128 MMHG | BODY MASS INDEX: 27.82 KG/M2 | HEIGHT: 63 IN | WEIGHT: 157 LBS | TEMPERATURE: 97.9 F

## 2022-02-09 DIAGNOSIS — C50.911 MALIGNANT NEOPLASM OF RIGHT BREAST IN FEMALE, ESTROGEN RECEPTOR POSITIVE, UNSPECIFIED SITE OF BREAST (HCC): Primary | ICD-10-CM

## 2022-02-09 DIAGNOSIS — Z17.0 MALIGNANT NEOPLASM OF RIGHT BREAST IN FEMALE, ESTROGEN RECEPTOR POSITIVE, UNSPECIFIED SITE OF BREAST (HCC): Primary | ICD-10-CM

## 2022-02-09 DIAGNOSIS — Z12.31 ENCOUNTER FOR SCREENING MAMMOGRAM FOR MALIGNANT NEOPLASM OF BREAST: ICD-10-CM

## 2022-02-09 DIAGNOSIS — J43.2 CENTRILOBULAR EMPHYSEMA (HCC): ICD-10-CM

## 2022-02-09 PROCEDURE — 99214 OFFICE O/P EST MOD 30 MIN: CPT | Performed by: INTERNAL MEDICINE

## 2022-02-09 RX ORDER — FLUTICASONE FUROATE, UMECLIDINIUM BROMIDE AND VILANTEROL TRIFENATATE 100; 62.5; 25 UG/1; UG/1; UG/1
1 POWDER RESPIRATORY (INHALATION) DAILY
COMMUNITY
Start: 2021-11-24

## 2022-02-09 NOTE — PROGRESS NOTES
Hematology / Oncology Outpatient Follow Up Note    Den Bachelor 64 y o  female HCQ:[birth year]0 [facility]:[id number]         Date:  2/9/2022    Assessment / Plan:  A 63 year old postmenopausal woman with stage IA right breast cancer, grade 1, ER/[place] positive HER-2 negative disease  She completed 5 years of adjuvant hormonal therapy with anastrozole in September 2014  She is currently on observation  She has no evidence recurrent disease, based on her symptoms and physical examinations  I recommended her to continue surveillance  I am going to set up bilateral screening mammography in July 2022  I will see her again in a year for routine follow-up  She is in agreement with my recommendations         Subjective:      HPI:             Interval History:  A 63 year old postmenopausal woman with stage IA right breast cancer, grade 1, ER/N[place] positive HER-2 negative disease diagnosed in May 2009  She underwent lumpectomy resulting in the WILLOW followed by adjuvant hormonal therapy with anastrozole  She completed 5 years of adjuvant hormonal therapy with anastrozole in September 2014  She presents today for routine follow-up  She absolutely has no new complaint  She feels well  She has no complaint of bone pain  She is up to date for all the COVID-19 vaccine  She denied any respiratory symptoms  Her weight is stable    Her performance status is normal      Objective:      Primary Diagnosis:     Right breast cancer, stage IA (pT1c, pN0, M0) grade 1, ER/NJ positive, HER2 negative disease, diagnosed in May of 2009       Cancer Staging:  Cancer Staging  No matching staging information was found for the patient         Previous Hematologic/ Oncologic Treatment:      Adjuvant hormonal therapy with anastrozole 1 mg once a day from September 2009 through September 2014       Current Hematologic/ Oncologic Treatment:       Observation      Disease Status:      WILLOW status post lumpectomy with sentinel lymph node biopsy       Test Results:     Pathology:           Radiology:     Bilateral mammography in July 2021 was benign   BI-RADS 2       Laboratory:           Physical Exam:        General Appearance:    Alert, oriented          Eyes:    PERRL   Ears:    Normal external ear canals, both ears   Nose:   Nares normal, septum midline   Throat:   Mucosa moist  Pharynx without injection  Neck:   Supple         Lungs:     Clear to auscultation bilaterally   Chest Wall:    No tenderness or deformity    Heart:    Regular rate and rhythm         Abdomen:     Soft, non-tender, bowel sounds +, no organomegaly               Extremities:   Extremities no cyanosis or edema         Skin:   no rash or icterus  Lymph nodes:   Cervical, supraclavicular, and axillary nodes normal   Neurologic:   CNII-XII intact, normal strength, sensation and reflexes     Throughout             Breast exam:   lumpectomy scar in the outer upper quadrant of the right breast with no palpable mass or nodule  The left breast is negative              ROS: Review of Systems   All other systems reviewed and are negative  Imaging: No results found  Labs:   Lab Results   Component Value Date    WBC 6 48 03/07/2019    HGB 13 2 03/07/2019    HCT 40 5 03/07/2019    MCV 95 03/07/2019     03/07/2019     Lab Results   Component Value Date     05/26/2015    K 3 8 03/07/2019     03/07/2019    CO2 33 (H) 03/07/2019    ANIONGAP 7 05/26/2015    BUN 17 03/07/2019    CREATININE 0 98 03/07/2019    GLUCOSE 92 05/26/2015    GLUF 105 (H) 04/26/2018    CALCIUM 9 1 03/07/2019    AST 19 03/07/2019    ALT 30 03/07/2019    ALKPHOS 95 03/07/2019    PROT 7 2 05/26/2015    BILITOT 0 8 05/26/2015    EGFR 57 03/07/2019         Lab Results   Component Value Date    CEA 2 6 03/07/2019           Current Medications: Reviewed  Allergies: Reviewed  PMH/FH/SH:  Reviewed      Vital Sign:    Body surface area is 1 74 meters squared      Wt Readings from Last 3 Encounters:   02/09/22 71 2 kg (157 lb)   07/29/21 72 6 kg (160 lb)   02/10/21 72 8 kg (160 lb 8 oz)        Temp Readings from Last 3 Encounters:   02/09/22 97 9 °F (36 6 °C) (Tympanic Core)   02/10/21 97 6 °F (36 4 °C) (Tympanic Core)   12/14/20 (!) 96 °F (35 6 °C) (Temporal)        BP Readings from Last 3 Encounters:   02/10/21 124/72   12/14/20 144/100   11/16/20 106/57         Pulse Readings from Last 3 Encounters:   02/09/22 83   02/10/21 104   12/14/20 71     @LASTSAO2(3)@

## 2022-08-01 ENCOUNTER — HOSPITAL ENCOUNTER (OUTPATIENT)
Dept: MAMMOGRAPHY | Facility: HOSPITAL | Age: 77
Discharge: HOME | End: 2022-08-01
Attending: INTERNAL MEDICINE
Payer: MEDICARE

## 2022-08-01 VITALS — HEIGHT: 63 IN | BODY MASS INDEX: 27.81 KG/M2 | WEIGHT: 156.97 LBS

## 2022-08-01 DIAGNOSIS — C50.911 MALIGNANT NEOPLASM OF RIGHT BREAST IN FEMALE, ESTROGEN RECEPTOR POSITIVE, UNSPECIFIED SITE OF BREAST (HCC): ICD-10-CM

## 2022-08-01 DIAGNOSIS — Z12.31 ENCOUNTER FOR SCREENING MAMMOGRAM FOR MALIGNANT NEOPLASM OF BREAST: ICD-10-CM

## 2022-08-01 DIAGNOSIS — Z17.0 MALIGNANT NEOPLASM OF RIGHT BREAST IN FEMALE, ESTROGEN RECEPTOR POSITIVE, UNSPECIFIED SITE OF BREAST (HCC): ICD-10-CM

## 2022-08-01 PROCEDURE — 77063 BREAST TOMOSYNTHESIS BI: CPT

## 2022-08-01 PROCEDURE — 77067 SCR MAMMO BI INCL CAD: CPT

## 2022-08-04 ENCOUNTER — TELEPHONE (OUTPATIENT)
Dept: HEMATOLOGY ONCOLOGY | Facility: CLINIC | Age: 77
End: 2022-08-04

## 2022-08-04 DIAGNOSIS — Z17.0 MALIGNANT NEOPLASM OF RIGHT BREAST IN FEMALE, ESTROGEN RECEPTOR POSITIVE, UNSPECIFIED SITE OF BREAST (HCC): Primary | ICD-10-CM

## 2022-08-04 DIAGNOSIS — Z12.31 ENCOUNTER FOR SCREENING MAMMOGRAM FOR MALIGNANT NEOPLASM OF BREAST: ICD-10-CM

## 2022-08-04 DIAGNOSIS — C50.911 MALIGNANT NEOPLASM OF RIGHT BREAST IN FEMALE, ESTROGEN RECEPTOR POSITIVE, UNSPECIFIED SITE OF BREAST (HCC): Primary | ICD-10-CM

## 2022-08-04 NOTE — TELEPHONE ENCOUNTER
----- Message from Merry Pa MD sent at 8/4/2022  9:19 AM EDT -----  Right breast needs additional imaging  Right screening mammo 3D needs to be repeated, since recent one does not cover posterior breast well  Can you set up? Mention  "Repeat right CC and MLO for more posterior tissue " This is what radiologist said  Whatever visible breast seems fine  Let patient know  Let her know

## 2022-08-04 NOTE — TELEPHONE ENCOUNTER
Spoke with Central Scheduling in regards to scheduling mammo for patient  A Nurse Navigator from Radiology at [facility] will be calling the patient directly to schedule the mammo because a radiologist has to be present for the type of mammo that was ordered

## 2022-08-11 ENCOUNTER — TELEPHONE (OUTPATIENT)
Dept: HEMATOLOGY ONCOLOGY | Facility: CLINIC | Age: 77
End: 2022-08-11

## 2022-08-11 ENCOUNTER — HOSPITAL ENCOUNTER (OUTPATIENT)
Dept: MAMMOGRAPHY | Facility: HOSPITAL | Age: 77
Discharge: HOME | End: 2022-08-11
Attending: INTERNAL MEDICINE

## 2022-08-11 DIAGNOSIS — Z12.31 VISIT FOR SCREENING MAMMOGRAM: ICD-10-CM

## 2022-08-11 NOTE — TELEPHONE ENCOUNTER
Left general message in regards to reviewing results  Left my direct call back number and hours of operation to further discuss               Per Dr Bertha Anderson, patient had a "normal Mammo"

## 2023-02-14 ENCOUNTER — OFFICE VISIT (OUTPATIENT)
Dept: HEMATOLOGY ONCOLOGY | Facility: CLINIC | Age: 78
End: 2023-02-14

## 2023-02-14 VITALS
SYSTOLIC BLOOD PRESSURE: 110 MMHG | HEART RATE: 102 BPM | BODY MASS INDEX: 28 KG/M2 | WEIGHT: 158 LBS | DIASTOLIC BLOOD PRESSURE: 60 MMHG | TEMPERATURE: 97.8 F | OXYGEN SATURATION: 97 % | HEIGHT: 63 IN | RESPIRATION RATE: 16 BRPM

## 2023-02-14 DIAGNOSIS — Z17.0 MALIGNANT NEOPLASM OF RIGHT BREAST IN FEMALE, ESTROGEN RECEPTOR POSITIVE, UNSPECIFIED SITE OF BREAST (HCC): Primary | ICD-10-CM

## 2023-02-14 DIAGNOSIS — C50.911 MALIGNANT NEOPLASM OF RIGHT BREAST IN FEMALE, ESTROGEN RECEPTOR POSITIVE, UNSPECIFIED SITE OF BREAST (HCC): Primary | ICD-10-CM

## 2023-02-14 DIAGNOSIS — Z12.31 ENCOUNTER FOR SCREENING MAMMOGRAM FOR MALIGNANT NEOPLASM OF BREAST: ICD-10-CM

## 2023-02-14 NOTE — PROGRESS NOTES
Hematology / Oncology Outpatient Follow Up Note    Renetta Mimsr 76 y o  female ZXV:[birth year] LBF:[id number]         Date:  2/14/2023    Assessment / Plan:  A 65 year old postmenopausal woman with stage IA right breast cancer, grade 1, ER/TN positive HER-2 negative disease  She completed 5 years of adjuvant hormonal therapy with anastrozole in September 2014  She is currently on observation  Clinically, she has no evidence of recurrent disease  I recommend her to continue surveillance  I am going to set up bilateral screening mammography in August 2023  I will see her again in a year for routine follow-up           Subjective:      HPI:             Interval History:  A 65 year old postmenopausal woman with stage IA right breast cancer, grade 1, ER/TN positive HER-2 negative disease diagnosed in May 2009  She underwent lumpectomy resulting in the WILLOW followed by adjuvant hormonal therapy with anastrozole  She completed 5 years of adjuvant hormonal therapy with anastrozole in September 2014  She presents today for routine follow-up  She feels well with no new complaints  She denied any pain  Her weight is stable  She has no respiratory symptoms  Her mammography in August 2022 was benign  Her performance status is normal       Objective:      Primary Diagnosis:     Right breast cancer, stage IA (pT1c, pN0, M0) grade 1, ER/TN positive, HER2 negative disease, diagnosed in May of 2009       Cancer Staging:  Cancer Staging  No matching staging information was found for the patient         Previous Hematologic/ Oncologic Treatment:      Adjuvant hormonal therapy with anastrozole 1 mg once a day from September 2009 through September 2014       Current Hematologic/ Oncologic Treatment:       Observation      Disease Status:      WILLOW status post lumpectomy with sentinel lymph node biopsy       Test Results:     Pathology:           Radiology:     Bilateral mammography in  August 2022 was benign   BI-RADS 2     Laboratory:           Physical Exam:        General Appearance:    Alert, oriented          Eyes:    PERRL   Ears:    Normal external ear canals, both ears   Nose:   Nares normal, septum midline   Throat:   Mucosa moist  Pharynx without injection  Neck:   Supple         Lungs:     Clear to auscultation bilaterally   Chest Wall:    No tenderness or deformity    Heart:    Regular rate and rhythm         Abdomen:     Soft, non-tender, bowel sounds +, no organomegaly               Extremities:   Extremities no cyanosis or edema         Skin:   no rash or icterus  Lymph nodes:   Cervical, supraclavicular, and axillary nodes normal   Neurologic:   CNII-XII intact, normal strength, sensation and reflexes     Throughout             Breast exam:   lumpectomy scar in the outer upper quadrant of the right breast with no palpable mass or nodule  The left breast is negative              ROS: Review of Systems   All other systems reviewed and are negative  Imaging: No results found  Labs:   Lab Results   Component Value Date    WBC 6 48 03/07/2019    HGB 13 2 03/07/2019    HCT 40 5 03/07/2019    MCV 95 03/07/2019     03/07/2019     Lab Results   Component Value Date     05/26/2015    K 3 8 03/07/2019     03/07/2019    CO2 33 (H) 03/07/2019    ANIONGAP 7 05/26/2015    BUN 17 03/07/2019    CREATININE 0 98 03/07/2019    GLUCOSE 92 05/26/2015    GLUF 105 (H) 04/26/2018    CALCIUM 9 1 03/07/2019    AST 19 03/07/2019    ALT 30 03/07/2019    ALKPHOS 95 03/07/2019    PROT 7 2 05/26/2015    BILITOT 0 8 05/26/2015    EGFR 57 03/07/2019         Lab Results   Component Value Date    CEA 2 6 03/07/2019           Current Medications: Reviewed  Allergies: Reviewed  PMH/FH/SH:  Reviewed      Vital Sign:    Body surface area is 1 75 meters squared      Wt Readings from Last 3 Encounters:   02/14/23 71 7 kg (158 lb)   08/01/22 71 2 kg (156 lb 15 5 oz)   02/09/22 71 2 kg (157 lb)        Temp Readings from Last 3 Encounters:   02/14/23 97 8 °F (36 6 °C) (Temporal)   02/09/22 97 9 °F (36 6 °C) (Tympanic Core)   02/10/21 97 6 °F (36 4 °C) (Tympanic Core)        BP Readings from Last 3 Encounters:   02/14/23 110/60   02/09/22 128/76   02/10/21 124/72         Pulse Readings from Last 3 Encounters:   02/14/23 102   02/09/22 83   02/10/21 104     @LASTSAO2(3)@

## 2023-04-21 LAB — HBA1C MFR BLD HPLC: 6 %

## 2023-08-07 ENCOUNTER — APPOINTMENT (OUTPATIENT)
Dept: RADIOLOGY | Facility: HOSPITAL | Age: 78
End: 2023-08-07
Payer: MEDICARE

## 2023-08-07 ENCOUNTER — HOSPITAL ENCOUNTER (OUTPATIENT)
Dept: MAMMOGRAPHY | Facility: HOSPITAL | Age: 78
Discharge: HOME | End: 2023-08-07
Attending: INTERNAL MEDICINE
Payer: MEDICARE

## 2023-08-07 ENCOUNTER — TELEPHONE (OUTPATIENT)
Dept: HEMATOLOGY ONCOLOGY | Facility: CLINIC | Age: 78
End: 2023-08-07

## 2023-08-07 VITALS — BODY MASS INDEX: 28 KG/M2 | WEIGHT: 158 LBS | HEIGHT: 63 IN

## 2023-08-07 DIAGNOSIS — Z12.31 ENCOUNTER FOR SCREENING MAMMOGRAM FOR MALIGNANT NEOPLASM OF BREAST: ICD-10-CM

## 2023-08-07 DIAGNOSIS — Z17.0 MALIGNANT NEOPLASM OF RIGHT BREAST IN FEMALE, ESTROGEN RECEPTOR POSITIVE, UNSPECIFIED SITE OF BREAST (HCC): ICD-10-CM

## 2023-08-07 DIAGNOSIS — C50.911 MALIGNANT NEOPLASM OF RIGHT BREAST IN FEMALE, ESTROGEN RECEPTOR POSITIVE, UNSPECIFIED SITE OF BREAST (HCC): ICD-10-CM

## 2023-08-07 PROCEDURE — 77067 SCR MAMMO BI INCL CAD: CPT

## 2023-08-07 PROCEDURE — 77063 BREAST TOMOSYNTHESIS BI: CPT

## 2024-10-24 ENCOUNTER — NURSING HOME VISIT (OUTPATIENT)
Dept: GERIATRICS | Facility: OTHER | Age: 79
End: 2024-10-24
Payer: MEDICARE

## 2024-10-24 DIAGNOSIS — Z87.09 H/O PNEUMOTHORAX: ICD-10-CM

## 2024-10-24 DIAGNOSIS — C34.90 NON-SMALL CELL LUNG CANCER WITH METASTASIS (HCC): Primary | ICD-10-CM

## 2024-10-24 DIAGNOSIS — I48.0 PAROXYSMAL ATRIAL FIBRILLATION (HCC): ICD-10-CM

## 2024-10-24 DIAGNOSIS — I50.32 CHRONIC DIASTOLIC HEART FAILURE (HCC): ICD-10-CM

## 2024-10-24 DIAGNOSIS — J44.9 CHRONIC OBSTRUCTIVE PULMONARY DISEASE, UNSPECIFIED COPD TYPE (HCC): ICD-10-CM

## 2024-10-24 DIAGNOSIS — E03.9 HYPOTHYROIDISM, UNSPECIFIED TYPE: ICD-10-CM

## 2024-10-24 DIAGNOSIS — Z17.0 MALIGNANT NEOPLASM OF RIGHT BREAST IN FEMALE, ESTROGEN RECEPTOR POSITIVE, UNSPECIFIED SITE OF BREAST (HCC): ICD-10-CM

## 2024-10-24 DIAGNOSIS — C50.911 MALIGNANT NEOPLASM OF RIGHT BREAST IN FEMALE, ESTROGEN RECEPTOR POSITIVE, UNSPECIFIED SITE OF BREAST (HCC): ICD-10-CM

## 2024-10-24 PROCEDURE — 99306 1ST NF CARE HIGH MDM 50: CPT | Performed by: FAMILY MEDICINE

## 2024-10-24 NOTE — PROGRESS NOTES
[facility]  History and Physical  POS: 31    Records Reviewed include:  Hospital records  History obtained from patient.    Chief Complaint/ Reason for Admission: Sepsis s/s Rt sided pneumonia and acute hypoxic respiratory failure    History of Present Illness:            HPI     Albina Silveira is am 80 y/o lady with a past medical history of breast cancer, NSCLC with liver metastasis, emphysema, atrial fibrillation on xarelto, anxiety, hypothyroidism and diastolic heart failure who was admitted at [facility]  for 9 days from 10/13- 10/22/2014  for sepsis with acute hypoxic respiratory failure.     Initially stable on 1.5LNC in ED. However, pt developed worsening shortness of breath and rales on exam. O2 requirements increased from NC to midflow to BiPAP. She was transferred to ICU at [facility]. ID was consulted. Pulm was consulted. Pt had received broad spec abx cefepime until 10/19 and steroid for COPD exacerbation and concern for pneumonitis component. until 10/18.  Her SpO2 has improved and pt was weaned to 3LNC.     CXR and CT chest showed bilateral pleural effurion ( Rt>Lt). IR thoracentesis was done at [facility] on 10/16 [id number] cc fluid removed from Rt Lungs. Cytology was negative for malignancy. However pt had developed hydropneumothorax following thoracentesis, she was monitored on serial CXRs. Pneumothorax stable on serial CXR, planned to recheck CXR in 1 week (10/28 Monday).    Cards consulted in the hospital and recommended to cpntinue IV lasix for acute CHF component. Was transitioned to PO lasix on 10/21.     Pt has hx of breast cancer and NSCLC with liver mets on active chemo regimen ( Carbo/Taxol/Pem).    10/24/2024: Pt seen and examined at bedside this morning. She was comfortably sitting in a chair and receiving 2.5L of oxygen via nasal cannula. AAO x3. Reports a small muscle knot on her right shoulder. Denied dizziness, headache, cough, chest pain,  shortness of breath, nausea, vomitting, abd pain. She is taking lactulose had a bowel movement this morning. She has good appetite, sleeps swell. Please see assessment and plans for further information.     No Known Allergies     Past Medical History  Past Medical History:   Diagnosis Date    Atrial fibrillation (HCC)     Breast CA (HCC) 2009    Right    COPD (chronic obstructive pulmonary disease) (HCC)     History of radiation therapy     Hypothyroid     Nasal congestion     Nosebleed         Past Surgical History:   Procedure Laterality Date    BREAST BIOPSY Right 2009    BREAST LUMPECTOMY Right 2009    MOUTH SURGERY      PILONIDAL CYST EXCISION      AZ LAPAROSCOPIC APPENDECTOMY N/A 02/14/2018    Procedure: APPENDECTOMY LAPAROSCOPIC;  Surgeon: BEST Rojo MD;  Location: [facility];  Service: General    AZ XCAPSL CTRC RMVL INSJ IO LENS PROSTH W/O ECP Right 11/16/2020    Procedure: EXTRACTION EXTRACAPSULAR CATARACT PHACO INTRAOCULAR LENS (IOL);  Surgeon: Haris Rutherford MD;  Location: [facility] MAIN OR;  Service: Ophthalmology    AZ XCAPSL CTRC RMVL INSJ IO LENS PROSTH W/O ECP Left 12/14/2020    Procedure: EXTRACTION EXTRACAPSULAR CATARACT PHACO INTRAOCULAR LENS (IOL);  Surgeon: Haris Rutherford MD;  Location: [facility] MAIN OR;  Service: Ophthalmology        Family History   Problem Relation Age of Onset    Throat cancer Father 60    Pancreatic cancer Paternal Aunt 83    Throat cancer Paternal Uncle 50    No Known Problems Mother     No Known Problems Sister     Breast cancer Daughter 44    No Known Problems Maternal Grandmother     No Known Problems Maternal Grandfather     No Known Problems Paternal Grandmother     No Known Problems Paternal Grandfather     No Known Problems Daughter     No Known Problems Sister     No Known Problems Sister     No Known Problems Maternal Aunt     No Known Problems Maternal Aunt     No Known Problems Maternal Aunt         Social History  Tobacco Use: Medium Risk (10/13/2024)     Received from [facility]    Patient History     Smoking Tobacco Use: Former     Smokeless Tobacco Use: Never     Passive Exposure: Never           Physical Exam    Weight: 133.1 Lbs 10/24/2024 10:44   Blood Pressure: 111 /  62 mmHg 10/24/2024 10:36   Temperature: 98.0 °F 10/24/2024 10:36   Pulse: 92 bpm 10/24/2024 10:36   Respirations: 18 Breaths/min 10/24/2024 10:36   Blood Sugar:      O2 Saturation: 95.0% 10/24/2024 10:36   Height: 63.0 Inches 10/23/2024 08:14   Pain Level: 0 10/24/2024 07:15       Physical Exam  Constitutional:       General: She is not in acute distress.     Appearance: Normal appearance. She is not toxic-appearing.   HENT:      Ears:      Comments: Some [place]. Wears hearing aids     Mouth/Throat:      Mouth: Mucous membranes are moist.      Pharynx: No oropharyngeal exudate or posterior oropharyngeal erythema.   Eyes:      Extraocular Movements: Extraocular movements intact.      Pupils: Pupils are equal, round, and reactive to light.   Cardiovascular:      Heart sounds: Normal heart sounds. No murmur heard.     No gallop.   Pulmonary:      Effort: No respiratory distress.      Breath sounds: Normal breath sounds. No wheezing or rales.   Abdominal:      General: Bowel sounds are normal. There is no distension.   Musculoskeletal:         General: No swelling.      Right lower leg: No edema.      Left lower leg: No edema.   Skin:     Coloration: Skin is not jaundiced or pale.      Findings: No lesion or rash.   Neurological:      Mental Status: She is oriented to person, place, and time.       Review of Systems:  Review of Systems   Constitutional:  Negative for chills and fever.   HENT:  Negative for ear pain, sore throat, trouble swallowing and voice change.    Eyes:  Negative for pain and visual disturbance.   Respiratory:  Negative for cough and shortness of breath.    Cardiovascular:  Negative for chest pain, palpitations and leg swelling.   Gastrointestinal:  Negative for  abdominal pain, blood in stool, constipation, diarrhea, nausea and vomiting.   Genitourinary:  Negative for difficulty urinating, dysuria, flank pain, hematuria, pelvic pain and vaginal pain.   Musculoskeletal:  Negative for arthralgias and back pain.        A small muscle knot on the right shoulder   Skin:  Negative for color change and rash.   Neurological:  Negative for dizziness, seizures, syncope and headaches.   All other systems reviewed and are negative.       List of Current Medications: Medication list reviewed and updated in Epic to reflect most current [facility] orders    Labs/Diagnostics (reviewed by this provider): Copy in Chart and Old Records    Component  Ref Range & Units 10/21/24  3:40 AM   Hemoglobin  11.5 - 14.5 g/dL 10.1 Low    Hematocrit  35.0 - 43.0 % 29.4 Low    WBC  4.0 - 10.0 thou/cmm 9.2   RBC  3.70 - 4.70 mill/cmm 3.23 Low    Platelet  140 - 350 thou/cmm 339   MPV  7.5 - 11.3 fL 6.8 Low    MCV  80 - 100 fL 91   MCH  26.0 - 34.0 pg 31.3   MCHC  32.0 - 37.0 g/dL 34.4   RDW  12.0 - 16.0 % 15.6   Differential Type AUTO   Absolute Neutrophils  1.8 - 7.8 thou/cmm 6.2   Absolute Lymphocytes  1.0 - 3.0 thou/cmm 1.6   Absolute Monocytes  0.3 - 1.0 thou/cmm 1.1 High    Absolute Eosinophils  0.0 - 0.5 thou/cmm 0.2   Absolute Basophils  0.0 - 0.1 thou/cmm 0.0   Neutrophils  % 68   Lymphocytes Manual  % 18   Monocytes  % 12   Eosinophils  % 2   Basophils  % 0               Component  Ref Range & Units 10/20/24  7:12 AM 10/19/24  3:46 AM 10/18/24  9:26 AM 10/17/24  3:36 AM 10/16/24  6:04 AM 10/16/24  6:04 AM 10/15/24  5:42 AM   Glucose  65 - 99 mg/dL 85 117 High  165 High  176 High   162 High  164 High    BUN  7 - 25 mg/dL 35 High  35 High  33 High  28 High   17 17   Creatinine  0.40 - 1.10 mg/dL 0.55 0.58 0.63 0.62  0.57 0.60   Sodium  135 - 145 mmol/L 140 140 139 136  134 Low  132 Low    Potassium  3.5 - 5.2 mmol/L 3.5 3.6 3.5 3.9  3.8 CM 3.6   Chloride  100 - 109 mmol/L 97 Low  98 Low  98 Low  98 Low    96 Low  93 Low    Carbon Dioxide  21 - 31 mmol/L 37 High  34 High  34 High  30  29 30   Calcium  8.5 - 10.1 mg/dL 7.8 Low  7.4 Low  7.8 Low  7.5 Low   7.5 Low  7.5 Low    Alkaline Phosphatase  35 - 120 U/L 174 High    155 High  162 High      ALBUMIN  3.5 - 5.7 g/dL 3.1 Low    3.0 Low  3.1 Low      Total Bilirubin  0.2 - 1.0 mg/dL 0.5   0.4 CM 0.5 CM     Comment: Eltrombopag and its metabolites may interfere with this assay causing erroneously high patient results.   Protein, Total  6.3 - 8.3 g/dL 5.7 Low    6.0 Low  6.4     AST  <41 U/L 17   16 26 CM     ALT  <56 U/L 21   17 17     ANION GAP  3 - 11 6 8 7 8  9 9   eGFRcr  >59 92 91 89 89  91 90          Imaging Reviewed:    Serial CXR reports taken in [facility]: 10/13, 10/14, 10/16, 10/17, 10/18, 10/19, 10/20, 10/21    CT chest wo contrast 10/18/24    CTA chest wo w contrast  10/14/24    Assessment/Plan:    Non small cell lung cancer with metastasis  Diagnosed in Aug 24   Follows with [facility] oncology, Dr Brewer  Recently started treatment with combination chemo/immunotherapy with Carboplatin, Alimta and Keytruda and monthly Xgeva. Given recent breast cancer diagnosis, plan was changed to palliative Carboplatin, Taxol and Keytruda.       H/O Malignant neoplasm of right breast in female, estrogen receptor positive, unspecified site of breast (HCC)   Hx of stage IB triple negative   Diagnosed 9/27/24 as second primary malignancy.       Paroxysmal atrial fibrillation  HR controlled  Maintained on Xarelto 20 mg daily, cardizem 180 mg od    COPD- moderate to severe  Completed steroids for COPD exacerbation.   No home O2 at baseline.  Currently on 2 L O2  Follows with Dr Deluna outpatient.   Continue Trelegy with PRN albuterol  Flonase bid    Acute hypoxic respiratory failure   Pt had fevers and SOB initially, required BiPAP and ICU transfer during admission  Etiology suspected to be multifactorial - COPD, acute PNA, pleural effusion, pneumonitis, known lung cancer   Pt does not  require O2 at baseline.   Currently requiring 2L NC.  Titrate down O2  to maintain sat >/= 90%.   Follow up outpt pulm.    Hypothyroidism  Continue Synthyroid 88 mcg daily    Chronic diastolic heart failure  Cards consulted  ECHO with EF 50-54%, moderate dilation of RV, hypokinesis of mid and distal lateral wall  S/p IV lasix  Euvolemic on exam  Continue oral lasix 20 mg od    H/O pneumothorax  Rt apical pneumothorax s/p thoracentesis   Serial CXR in the hospital stable  Repeat CXR in 1 week ordered for 10/28/24     Rt pleural effusion  Acute and recurrent, exudative - s/p R thora 10/16 in IR. Culture and cyto negative, but does not rule out malignant effusion.   Recheck CXR on 10/28 for follow up     10. R sided pneumonia   Completed 7 days empiric abx Cefepime for CAP.   Outpatient CXR in 6-8 weeks for resolution of infiltrate    11. Encounter for medication review  Current medications  Allopurinol 100 mg daily  Calcium carbonate vitamin D3 500 mg - 15mcg daily  Diltiazem 180 mg daily  Famotidine 20 mg twice daily  Fluticasone propionate 50 mcg Flonase 2 spray into each nostril daily  Trelegy Ellipta ( fluticasone-umeclidinium-vilanterol ) 1 puff daily  Lactulose 30 mL daily  Loratadine 10 mg daily  Xarelto 20 mg daily  Synthroid 88 mcg daily  Mirtazapine 7.5 mg at bedtime  Lasix 20 mg daily  Xanax 0.25 mg twice daily as needed    Summary of recommendations  Increase Flonase bid for post nasal drip at bedtime  Recheck CXR and labs on Monday 10/28  Provide a paper copy of lab/imaging reports to patient  Titrate down the oxygen as tolerated ( Keep SpO2 >90%)  Continue current medications  Out pt follow up with your drs.       Advanced Directives: yes: Yes   Code status:Full Code  PCP: Jacque Hannah, DO Mckayla Ho DO

## 2024-10-25 ENCOUNTER — NURSING HOME VISIT (OUTPATIENT)
Dept: GERIATRICS | Facility: OTHER | Age: 79
End: 2024-10-25
Payer: MEDICARE

## 2024-10-25 DIAGNOSIS — H04.123 DRY EYES: Primary | ICD-10-CM

## 2024-10-25 PROCEDURE — 99308 SBSQ NF CARE LOW MDM 20: CPT

## 2024-10-25 RX ORDER — DIPHENHYDRAMINE HCL 25 MG
1 CAPSULE ORAL 3 TIMES DAILY
Start: 2024-10-25 | End: 2024-11-05

## 2024-10-25 NOTE — ASSESSMENT & PLAN NOTE
Left eye examined after patient noted that she had a gritty sensation. Eye with no redness, drainage and vision intact.  Patient verbalized that it must have been eyelash as that sensation resolved. However she did complain of her eyes feeling dry.   Will order artificial tears 2 drops three times a day.    Orders:    dextran 70-hypromellose (Artificial Tears) 0.1-0.3 % ophthalmic solution; Administer 1 drop to both eyes 3 (three) times a day

## 2024-10-25 NOTE — PROGRESS NOTES
Nursing Home Visit  [facility]  POS 31    Name: Albina Silveira      : 1945      MRN: [id number]  Encounter Provider: DINORAH Sood  Encounter Date: 10/25/2024   [facility]    Assessment & Plan  Dry eyes  Left eye examined after patient noted that she had a gritty sensation. Eye with no redness, drainage and vision intact.  Patient verbalized that it must have been eyelash as that sensation resolved. However she did complain of her eyes feeling dry.   Will order artificial tears 2 drops three times a day.    Orders:    dextran 70-hypromellose (Artificial Tears) 0.1-0.3 % ophthalmic solution; Administer 1 drop to both eyes 3 (three) times a day      History of Present Illness     Albina Silveira is a 79 y.o. female who was examined after she noted that her left eye had a gritty sensation.Upon examination patient was alert, awake and oriented x3.  Left eye  was examined and there was no redness, drainage  or vision impairment. Discussed plan of care with both patient and daughter to start artificial tears and both were agreeable.      Review of Systems   Eyes:  Negative for photophobia, pain, discharge, redness, itching and visual disturbance.         Past Medical History:   Diagnosis Date    Atrial fibrillation (HCC)     Breast CA (HCC) 2009    Right    COPD (chronic obstructive pulmonary disease) (HCC)     History of radiation therapy     Hypothyroid     Nasal congestion     Nosebleed      Past Surgical History:   Procedure Laterality Date    BREAST BIOPSY Right 2009    BREAST LUMPECTOMY Right 2009    MOUTH SURGERY      PILONIDAL CYST EXCISION      VT LAPAROSCOPIC APPENDECTOMY N/A 2018    Procedure: APPENDECTOMY LAPAROSCOPIC;  Surgeon: BEST Rojo MD;  Location: [facility];  Service: General    VT XCAPSL CTRC RMVL INSJ IO LENS PROSTH W/O ECP Right 2020    Procedure: EXTRACTION EXTRACAPSULAR CATARACT PHACO INTRAOCULAR  LENS (IOL);  Surgeon: Haris Rutherford MD;  Location: [facility] MAIN OR;  Service: Ophthalmology    RI XCAPSL CTRC RMVL INSJ IO LENS PROSTH W/O ECP Left 12/14/2020    Procedure: EXTRACTION EXTRACAPSULAR CATARACT PHACO INTRAOCULAR LENS (IOL);  Surgeon: Haris Rutherford MD;  Location: [facility] MAIN OR;  Service: Ophthalmology     Family History   Problem Relation Age of Onset    Throat cancer Father 60    Pancreatic cancer Paternal Aunt 83    Throat cancer Paternal Uncle 50    No Known Problems Mother     No Known Problems Sister     Breast cancer Daughter 44    No Known Problems Maternal Grandmother     No Known Problems Maternal Grandfather     No Known Problems Paternal Grandmother     No Known Problems Paternal Grandfather     No Known Problems Daughter     No Known Problems Sister     No Known Problems Sister     No Known Problems Maternal Aunt     No Known Problems Maternal Aunt     No Known Problems Maternal Aunt      Current Outpatient Medications on File Prior to Visit   Medication Sig Dispense Refill    albuterol (PROVENTIL HFA,VENTOLIN HFA) 90 mcg/act inhaler Inhale 2 puffs every 4 (four) hours as needed      calcium carbonate (OS-ANALISA) 600 MG tablet Take 600 mg by mouth 2 (two) times a day with meals      diltiazem (CARDIZEM CD) 180 mg 24 hr capsule Take 180 mg by mouth daily      fluticasone-umeclidinium-vilanterol (Trelegy Ellipta) 100-62.5-25 MCG/INH inhaler Inhale 1 puff daily      ipratropium (ATROVENT) 0.03 % nasal spray 2 sprays into each nostril 2 (two) times a day as needed for rhinitis For runny nose or post-nasal drip. 30 mL 11    levothyroxine 100 mcg tablet Take 88 mcg by mouth daily      multivitamin (THERAGRAN) TABS Take 1 tablet by mouth daily      rivaroxaban (XARELTO) 20 mg tablet Take 20 mg by mouth daily with breakfast      triamterene-hydrochlorothiazide (DYAZIDE) 37.5-25 mg per capsule Take 1 capsule by mouth every morning      Umeclidinium-Vilanterol (ANORO ELLIPTA IN) Inhale       No current  facility-administered medications on file prior to visit.   No Known Allergies   Current Outpatient Medications on File Prior to Visit   Medication Sig Dispense Refill    albuterol (PROVENTIL HFA,VENTOLIN HFA) 90 mcg/act inhaler Inhale 2 puffs every 4 (four) hours as needed      calcium carbonate (OS-ANALISA) 600 MG tablet Take 600 mg by mouth 2 (two) times a day with meals      diltiazem (CARDIZEM CD) 180 mg 24 hr capsule Take 180 mg by mouth daily      fluticasone-umeclidinium-vilanterol (Trelegy Ellipta) 100-62.5-25 MCG/INH inhaler Inhale 1 puff daily      ipratropium (ATROVENT) 0.03 % nasal spray 2 sprays into each nostril 2 (two) times a day as needed for rhinitis For runny nose or post-nasal drip. 30 mL 11    levothyroxine 100 mcg tablet Take 88 mcg by mouth daily      multivitamin (THERAGRAN) TABS Take 1 tablet by mouth daily      rivaroxaban (XARELTO) 20 mg tablet Take 20 mg by mouth daily with breakfast      triamterene-hydrochlorothiazide (DYAZIDE) 37.5-25 mg per capsule Take 1 capsule by mouth every morning      Umeclidinium-Vilanterol (ANORO ELLIPTA IN) Inhale       No current facility-administered medications on file prior to visit.      Social History     Tobacco Use    Smoking status: Former     Current packs/day: 0.00     Average packs/day: 1 pack/day for 40.0 years (40.0 ttl pk-yrs)     Types: Cigarettes     Start date: 1965     Quit date: 2005     Years since quittin.7    Smokeless tobacco: Never   Substance and Sexual Activity    Alcohol use: Yes     Alcohol/week: 7.0 standard drinks of alcohol     Types: 7 Glasses of wine per week    Drug use: No    Sexual activity: Not on file         Objective     Physical Exam  Eyes:      General:         Right eye: No discharge.         Left eye: No discharge.      Extraocular Movements: Extraocular movements intact.         Michelle Mcdonald  10/25/2024@2520

## 2024-10-29 ENCOUNTER — NURSING HOME VISIT (OUTPATIENT)
Dept: GERIATRICS | Facility: OTHER | Age: 79
End: 2024-10-29
Payer: MEDICARE

## 2024-10-29 VITALS
TEMPERATURE: 97.3 F | DIASTOLIC BLOOD PRESSURE: 65 MMHG | SYSTOLIC BLOOD PRESSURE: 119 MMHG | RESPIRATION RATE: 18 BRPM | HEART RATE: 93 BPM

## 2024-10-29 DIAGNOSIS — Z87.09 H/O PNEUMOTHORAX: Primary | ICD-10-CM

## 2024-10-29 DIAGNOSIS — J44.9 CHRONIC OBSTRUCTIVE PULMONARY DISEASE, UNSPECIFIED COPD TYPE (HCC): ICD-10-CM

## 2024-10-29 DIAGNOSIS — I48.91 ATRIAL FIBRILLATION, UNSPECIFIED TYPE (HCC): ICD-10-CM

## 2024-10-29 DIAGNOSIS — I50.32 CHRONIC DIASTOLIC HEART FAILURE (HCC): ICD-10-CM

## 2024-10-29 PROCEDURE — 99309 SBSQ NF CARE MODERATE MDM 30: CPT

## 2024-10-29 NOTE — ASSESSMENT & PLAN NOTE
Chest Xray results from 10/28 with negative for pneumothorax, but note that patient has lower right lung effusion which is consistent with bronchopneumonia.  -Will start Zithromax antibiotic for 5 days.

## 2024-10-29 NOTE — ASSESSMENT & PLAN NOTE
-On antibiotic treatment x 5 days for bronchopneumonia.  -Continue Anoro Ellipta inhaler.  -continue chronic oxygen use at 2lnc

## 2024-10-29 NOTE — PROGRESS NOTES
Nursing Home Visit    Name: Albina Silveira      : 1945      MRN: [id number]  Encounter Provider: DINORAH Sood  Encounter Date: 10/29/2024   Encounter department: [facility]    POS 31    Assessment & Plan  H/O pneumothorax  Chest Xray results from 10/28 with negative for pneumothorax, but note that patient has lower right lung effusion which is consistent with bronchopneumonia.  -Will start Zithromax antibiotic for 5 days.         Atrial fibrillation, unspecified type (HCC)  Current heart rate is 93 and controlled.  -Continue with Xarelto 20 mg po once daily          Chronic obstructive pulmonary disease, unspecified COPD type (HCC)  -On antibiotic treatment x 5 days for bronchopneumonia.  -Continue Anoro Ellipta inhaler.  -continue chronic oxygen use at 2lnc         Chronic diastolic heart failure (HCC)  Current weight -137 lbs  - Will reweigh in the morning   -Continue Dyazide for CHF management.                   History of Present Illness       Albina Silveira is a 79 y.o. female who was examined today for follow up care of current acute and chronic conditions. On assessment patient is alert, awake, cooperative and offers no new concerns at this time.However right lower lung fields with coarse lung sound auscultated and patient noted to be with increased work of breathing which she says is normal for her. Patient results of chest Xray  from 10/28 reviewed with findings of lower right lung effusion consistent with bronchopneumonia noted. Patient started on Zithromax. Plan of care discussed with daughter who verbalized that she will share with her mom at this time.      Review of Systems   Constitutional:  Negative for activity change and fever.   HENT:  Negative for congestion and rhinorrhea.    Respiratory:  Positive for shortness of breath. Negative for cough and wheezing.    Cardiovascular:  Positive for leg swelling. Negative for chest pain and  palpitations.   Gastrointestinal:  Negative for constipation and nausea.   Genitourinary:  Negative for dysuria and urgency.   Musculoskeletal:  Negative for arthralgias.   Skin:  Negative for color change.   Neurological:  Positive for weakness. Negative for dizziness.   Psychiatric/Behavioral:  Negative for agitation. The patient is not nervous/anxious.      Pertinent Medical History       {  Past Medical History   Past Medical History:   Diagnosis Date    Atrial fibrillation (HCC)     Breast CA (HCC) 2009    Right    COPD (chronic obstructive pulmonary disease) (HCC)     History of radiation therapy     Hypothyroid     Nasal congestion     Nosebleed      Past Surgical History:   Procedure Laterality Date    BREAST BIOPSY Right 2009    BREAST LUMPECTOMY Right 2009    MOUTH SURGERY      PILONIDAL CYST EXCISION      [place] LAPAROSCOPIC APPENDECTOMY N/A 02/14/2018    Procedure: APPENDECTOMY LAPAROSCOPIC;  Surgeon: BEST Rojo MD;  [facility]: [facility];  Service: General    FL XCAPSL CTRC RMVL INSJ IO LENS PROSTH W/O ECP Right 11/16/2020    Procedure: EXTRACTION EXTRACAPSULAR CATARACT PHACO INTRAOCULAR LENS (IOL);  Surgeon: Haris Rutherford MD;  Location: [facility] MAIN OR;  Service: Ophthalmology    FL XCAPSL CTRC RMVL INSJ IO LENS PROSTH W/O ECP Left 12/14/2020    Procedure: EXTRACTION EXTRACAPSULAR CATARACT PHACO INTRAOCULAR LENS (IOL);  Surgeon: Haris Rutherford MD;  Location: [facility] MAIN OR;  Service: Ophthalmology     Family History   Problem Relation Age of Onset    Throat cancer Father 60    Pancreatic cancer Paternal Aunt 83    Throat cancer Paternal Uncle 50    No Known Problems Mother     No Known Problems Sister     Breast cancer Daughter 44    No Known Problems Maternal Grandmother     No Known Problems Maternal Grandfather     No Known Problems Paternal Grandmother     No Known Problems Paternal Grandfather     No Known Problems Daughter     No Known Problems Sister     No Known Problems Sister     No Known  Problems Maternal Aunt     No Known Problems Maternal Aunt     No Known Problems Maternal Aunt      Current Outpatient Medications on File Prior to Visit   Medication Sig Dispense Refill    albuterol (PROVENTIL HFA,VENTOLIN HFA) 90 mcg/act inhaler Inhale 2 puffs every 4 (four) hours as needed      calcium carbonate (OS-ANALISA) 600 MG tablet Take 600 mg by mouth 2 (two) times a day with meals      dextran 70-hypromellose (Artificial Tears) 0.1-0.3 % ophthalmic solution Administer 1 drop to both eyes 3 (three) times a day      diltiazem (CARDIZEM CD) 180 mg 24 hr capsule Take 180 mg by mouth daily      fluticasone-umeclidinium-vilanterol (Trelegy Ellipta) 100-62.5-25 MCG/INH inhaler Inhale 1 puff daily      ipratropium (ATROVENT) 0.03 % nasal spray 2 sprays into each nostril 2 (two) times a day as needed for rhinitis For runny nose or post-nasal drip. 30 mL 11    levothyroxine 100 mcg tablet Take 88 mcg by mouth daily      multivitamin (THERAGRAN) TABS Take 1 tablet by mouth daily      rivaroxaban (XARELTO) 20 mg tablet Take 20 mg by mouth daily with breakfast      triamterene-hydrochlorothiazide (DYAZIDE) 37.5-25 mg per capsule Take 1 capsule by mouth every morning      Umeclidinium-Vilanterol (ANORO ELLIPTA IN) Inhale       No current facility-administered medications on file prior to visit.   No Known Allergies   Current Outpatient Medications on File Prior to Visit   Medication Sig Dispense Refill    albuterol (PROVENTIL HFA,VENTOLIN HFA) 90 mcg/act inhaler Inhale 2 puffs every 4 (four) hours as needed      calcium carbonate (OS-ANALISA) 600 MG tablet Take 600 mg by mouth 2 (two) times a day with meals      dextran 70-hypromellose (Artificial Tears) 0.1-0.3 % ophthalmic solution Administer 1 drop to both eyes 3 (three) times a day      diltiazem (CARDIZEM CD) 180 mg 24 hr capsule Take 180 mg by mouth daily      fluticasone-umeclidinium-vilanterol (Trelegy Ellipta) 100-62.5-25 MCG/INH inhaler Inhale 1 puff daily       ipratropium (ATROVENT) 0.03 % nasal spray 2 sprays into each nostril 2 (two) times a day as needed for rhinitis For runny nose or post-nasal drip. 30 mL 11    levothyroxine 100 mcg tablet Take 88 mcg by mouth daily      multivitamin (THERAGRAN) TABS Take 1 tablet by mouth daily      rivaroxaban (XARELTO) 20 mg tablet Take 20 mg by mouth daily with breakfast      triamterene-hydrochlorothiazide (DYAZIDE) 37.5-25 mg per capsule Take 1 capsule by mouth every morning      Umeclidinium-Vilanterol (ANORO ELLIPTA IN) Inhale       No current facility-administered medications on file prior to visit.      Social History     Tobacco Use    Smoking status: Former     Current packs/day: 0.00     Average packs/day: 1 pack/day for 40.0 years (40.0 ttl pk-yrs)     Types: Cigarettes     Start date: 1965     Quit date: 2005     Years since quittin.7    Smokeless tobacco: Never   Substance and Sexual Activity    Alcohol use: Yes     Alcohol/week: 7.0 standard drinks of alcohol     Types: 7 Glasses of wine per week    Drug use: No    Sexual activity: Not on file         Objective     /65   Pulse 93   Temp (!) 97.3 °F (36.3 °C)   Resp 18     Physical Exam  HENT:      Head: Normocephalic.   Cardiovascular:      Rate and Rhythm: Normal rate. Rhythm irregular.      Pulses: Normal pulses.      Heart sounds: Normal heart sounds.   Pulmonary:      Breath sounds: Rhonchi present. No wheezing.   Abdominal:      General: Bowel sounds are normal.      Palpations: Abdomen is soft.   Musculoskeletal:      Right lower leg: Edema present.      Left lower leg: Edema present.   Skin:     General: Skin is warm and dry.      Capillary Refill: Capillary refill takes less than 2 seconds.   Neurological:      General: No focal deficit present.      Mental Status: She is alert.   Psychiatric:         Mood and Affect: Mood normal.         Behavior: Behavior normal.           Michelle Tamara COPELAND  10/29/2024 @12:27

## 2024-10-29 NOTE — ASSESSMENT & PLAN NOTE
Current weight -137 lbs  - Will reweigh in the morning   -Continue Dyazide for CHF management.

## 2024-11-01 ENCOUNTER — NURSING HOME VISIT (OUTPATIENT)
Dept: GERIATRICS | Facility: OTHER | Age: 79
End: 2024-11-01
Payer: MEDICARE

## 2024-11-01 VITALS
HEART RATE: 99 BPM | OXYGEN SATURATION: 96 % | RESPIRATION RATE: 18 BRPM | SYSTOLIC BLOOD PRESSURE: 126 MMHG | DIASTOLIC BLOOD PRESSURE: 62 MMHG | TEMPERATURE: 97.1 F

## 2024-11-01 DIAGNOSIS — I48.91 ATRIAL FIBRILLATION, UNSPECIFIED TYPE (HCC): ICD-10-CM

## 2024-11-01 DIAGNOSIS — F41.9 ANXIETY: ICD-10-CM

## 2024-11-01 DIAGNOSIS — I50.32 CHRONIC DIASTOLIC HEART FAILURE (HCC): ICD-10-CM

## 2024-11-01 DIAGNOSIS — J44.9 CHRONIC OBSTRUCTIVE PULMONARY DISEASE, UNSPECIFIED COPD TYPE (HCC): Primary | ICD-10-CM

## 2024-11-01 PROCEDURE — 99309 SBSQ NF CARE MODERATE MDM 30: CPT

## 2024-11-01 NOTE — ASSESSMENT & PLAN NOTE
-Currently on Antibiotic treatment for bronchopneumonia remains afebrile.  -Continue to monitor vital signs.  -Continue routine and prn inhaler and neb treatments.  -Continue use of oxygen therapy   -Encourage use of incentive spirometer.

## 2024-11-01 NOTE — PROGRESS NOTES
Nursing Home Visit    Name: Albina Silveira      : 1945      MRN: [id number]  Encounter Provider: DINORAH Sood  Encounter Date: 2024   Encounter department: [facility]    Pos 31    Assessment & Plan  Chronic obstructive pulmonary disease, unspecified COPD type (HCC)  -Currently on Antibiotic treatment for bronchopneumonia remains afebrile.  -Continue to monitor vital signs.  -Continue routine and prn inhaler and neb treatments.  -Continue use of oxygen therapy   -Encourage use of incentive spirometer.         Atrial fibrillation, unspecified type (HCC)  -Current rate is controlled at 99.  -Continue Xarelto .  -Monitor for increased bleeding and bruising.         Anxiety  Continue with Xanax as needed for anxiety.  -Offer other alternatives to help allay anxiety for example encouraging patient to express concerns  -Encourage therapeutic breathing to help ease anxiety.         Chronic diastolic heart failure (HCC)  -Current weight is 136.9 lbs.  -Continue dyazide.  -continue to monitor weight   -encourage feet elevation and soy stockings to manage ble edema.           History of Present Illness   Albina Silveira is a 79 y.o. female who was examined today for follow up care of bronchopneumonia and  isbeing treated with antibiotic with no untoward side effects reported. On examination patient is alert, awake and oriented x3. Breathing is less labored and patient is able to use incentive spirometer and tolerates this activity well.Currently patient offers no concerns.      Review of Systems   Constitutional:  Negative for activity change and fever.   Respiratory:  Positive for shortness of breath. Negative for cough, chest tightness and wheezing.    Cardiovascular:  Positive for leg swelling. Negative for chest pain and palpitations.   Gastrointestinal:  Negative for constipation.   Genitourinary:  Negative for dysuria and frequency.   Musculoskeletal:  Negative for  arthralgias.   Neurological:  Positive for weakness. Negative for headaches.   Psychiatric/Behavioral:  Negative for sleep disturbance. The patient is not nervous/anxious.      Pertinent Medical History           Medical History Reviewed by provider this encounter:       Past Medical History   Past Medical History:   Diagnosis Date    Atrial fibrillation (HCC)     Breast CA (HCC) 2009    Right    COPD (chronic obstructive pulmonary disease) (HCC)     History of radiation therapy     Hypothyroid     Nasal congestion     Nosebleed      Past Surgical History:   Procedure Laterality Date    BREAST BIOPSY Right 2009    BREAST LUMPECTOMY Right 2009    MOUTH SURGERY      PILONIDAL CYST EXCISION      [place] LAPAROSCOPIC APPENDECTOMY N/A 02/14/2018    Procedure: APPENDECTOMY LAPAROSCOPIC;  Surgeon: BEST Rojo MD;  Location: [facility];  Service: General    [place] XCAPSL CTRC RMVL INSJ IO LENS PROSTH W/O ECP Right 11/16/2020    Procedure: EXTRACTION EXTRACAPSULAR CATARACT PHACO INTRAOCULAR LENS (IOL);  Surgeon: Haris Rutherford MD;  Location: [facility] MAIN OR;  Service: Ophthalmology    [place] XCAPSL CTRC RMVL INSJ IO LENS PROSTH W/O ECP Left 12/14/2020    Procedure: EXTRACTION EXTRACAPSULAR CATARACT PHACO INTRAOCULAR LENS (IOL);  Surgeon: Haris Rutherford MD;  Location: [facility] MAIN OR;  Service: Ophthalmology     Family History   Problem Relation Age of Onset    Throat cancer Father 60    Pancreatic cancer Paternal Aunt 83    Throat cancer Paternal Uncle 50    No Known Problems Mother     No Known Problems Sister     Breast cancer Daughter 44    No Known Problems Maternal Grandmother     No Known Problems Maternal Grandfather     No Known Problems Paternal Grandmother     No Known Problems Paternal Grandfather     No Known Problems Daughter     No Known Problems Sister     No Known Problems Sister     No Known Problems Maternal Aunt     No Known Problems Maternal Aunt     No Known Problems Maternal Aunt      Current Outpatient  Medications on File Prior to Visit   Medication Sig Dispense Refill    albuterol (PROVENTIL HFA,VENTOLIN HFA) 90 mcg/act inhaler Inhale 2 puffs every 4 (four) hours as needed      calcium carbonate (OS-ANALISA) 600 MG tablet Take 600 mg by mouth 2 (two) times a day with meals      dextran 70-hypromellose (Artificial Tears) 0.1-0.3 % ophthalmic solution Administer 1 drop to both eyes 3 (three) times a day      diltiazem (CARDIZEM CD) 180 mg 24 hr capsule Take 180 mg by mouth daily      fluticasone-umeclidinium-vilanterol (Trelegy Ellipta) 100-62.5-25 MCG/INH inhaler Inhale 1 puff daily      ipratropium (ATROVENT) 0.03 % nasal spray 2 sprays into each nostril 2 (two) times a day as needed for rhinitis For runny nose or post-nasal drip. 30 mL 11    levothyroxine 100 mcg tablet Take 88 mcg by mouth daily      multivitamin (THERAGRAN) TABS Take 1 tablet by mouth daily      rivaroxaban (XARELTO) 20 mg tablet Take 20 mg by mouth daily with breakfast      triamterene-hydrochlorothiazide (DYAZIDE) 37.5-25 mg per capsule Take 1 capsule by mouth every morning      Umeclidinium-Vilanterol (ANORO ELLIPTA IN) Inhale       No current facility-administered medications on file prior to visit.   No Known Allergies   Current Outpatient Medications on File Prior to Visit   Medication Sig Dispense Refill    albuterol (PROVENTIL HFA,VENTOLIN HFA) 90 mcg/act inhaler Inhale 2 puffs every 4 (four) hours as needed      calcium carbonate (OS-ANALISA) 600 MG tablet Take 600 mg by mouth 2 (two) times a day with meals      dextran 70-hypromellose (Artificial Tears) 0.1-0.3 % ophthalmic solution Administer 1 drop to both eyes 3 (three) times a day      diltiazem (CARDIZEM CD) 180 mg 24 hr capsule Take 180 mg by mouth daily      fluticasone-umeclidinium-vilanterol (Trelegy Ellipta) 100-62.5-25 MCG/INH inhaler Inhale 1 puff daily      ipratropium (ATROVENT) 0.03 % nasal spray 2 sprays into each nostril 2 (two) times a day as needed for rhinitis For runny  nose or post-nasal drip. 30 mL 11    levothyroxine 100 mcg tablet Take 88 mcg by mouth daily      multivitamin (THERAGRAN) TABS Take 1 tablet by mouth daily      rivaroxaban (XARELTO) 20 mg tablet Take 20 mg by mouth daily with breakfast      triamterene-hydrochlorothiazide (DYAZIDE) 37.5-25 mg per capsule Take 1 capsule by mouth every morning      Umeclidinium-Vilanterol (ANORO ELLIPTA IN) Inhale       No current facility-administered medications on file prior to visit.      Social History     Tobacco Use    Smoking status: Former     Current packs/day: 0.00     Average packs/day: 1 pack/day for 40.0 years (40.0 ttl pk-yrs)     Types: Cigarettes     Start date: 1965     Quit date: 2005     Years since quittin.7    Smokeless tobacco: Never   Substance and Sexual Activity    Alcohol use: Yes     Alcohol/week: 7.0 standard drinks of alcohol     Types: 7 Glasses of wine per week    Drug use: No    Sexual activity: Not on file         Objective     Vital Signs    /62   Pulse 99   Temp (!) 97.1 °F (36.2 °C)   Resp 18   SpO2 96%     Physical Exam  HENT:      Mouth/Throat:      Mouth: Mucous membranes are moist.   Cardiovascular:      Rate and Rhythm: Regular rhythm.      Pulses: Normal pulses.      Heart sounds: Normal heart sounds.   Pulmonary:      Effort: Pulmonary effort is normal.      Breath sounds: Normal breath sounds. No wheezing.      Comments: Breathing is less labored and patient tolerating activities more. Incentive spirometer at 1000 and is well tolerated  Abdominal:      General: Bowel sounds are normal.      Palpations: Abdomen is soft.   Skin:     General: Skin is warm and dry.      Capillary Refill: Capillary refill takes less than 2 seconds.   Neurological:      Mental Status: She is alert. Mental status is at baseline.   Psychiatric:         Mood and Affect: Mood normal.         Behavior: Behavior normal.           Michelle Wilhelmcarolyn  2024@11:36

## 2024-11-01 NOTE — ASSESSMENT & PLAN NOTE
-Current rate is controlled at 99.  -Continue Xarelto .  -Monitor for increased bleeding and bruising.

## 2024-11-01 NOTE — ASSESSMENT & PLAN NOTE
Continue with Xanax as needed for anxiety.  -Offer other alternatives to help allay anxiety for example encouraging patient to express concerns  -Encourage therapeutic breathing to help ease anxiety.

## 2024-11-01 NOTE — ASSESSMENT & PLAN NOTE
-Current weight is 136.9 lbs.  -Continue dyazide.  -continue to monitor weight   -encourage feet elevation and soy stockings to manage ble edema.

## 2024-11-05 ENCOUNTER — NURSING HOME VISIT (OUTPATIENT)
Dept: GERIATRICS | Facility: OTHER | Age: 79
End: 2024-11-05
Payer: MEDICARE

## 2024-11-05 DIAGNOSIS — J18.9 PNEUMONIA OF RIGHT LOWER LOBE DUE TO INFECTIOUS ORGANISM: ICD-10-CM

## 2024-11-05 DIAGNOSIS — I48.91 ATRIAL FIBRILLATION, UNSPECIFIED TYPE (HCC): ICD-10-CM

## 2024-11-05 DIAGNOSIS — C34.90 NON-SMALL CELL LUNG CANCER WITH METASTASIS (HCC): ICD-10-CM

## 2024-11-05 DIAGNOSIS — R53.81 PHYSICAL DECONDITIONING: ICD-10-CM

## 2024-11-05 DIAGNOSIS — Z71.89 GOALS OF CARE, COUNSELING/DISCUSSION: ICD-10-CM

## 2024-11-05 DIAGNOSIS — I50.32 CHRONIC DIASTOLIC HEART FAILURE (HCC): ICD-10-CM

## 2024-11-05 DIAGNOSIS — J96.01 ACUTE HYPOXIC RESPIRATORY FAILURE (HCC): Primary | ICD-10-CM

## 2024-11-05 PROBLEM — K37 APPENDICITIS: Status: RESOLVED | Noted: 2018-02-14 | Resolved: 2024-11-05

## 2024-11-05 PROBLEM — R73.03 PREDIABETES: Status: ACTIVE | Noted: 2023-09-29

## 2024-11-05 PROBLEM — J43.2 CENTRILOBULAR EMPHYSEMA (HCC): Status: ACTIVE | Noted: 2020-03-05

## 2024-11-05 PROBLEM — A41.9 SEPSIS (HCC): Status: ACTIVE | Noted: 2024-10-13

## 2024-11-05 PROBLEM — C34.92 METASTATIC LUNG CANCER (METASTASIS FROM LUNG TO OTHER SITE), LEFT (HCC): Status: ACTIVE | Noted: 2024-01-01

## 2024-11-05 PROCEDURE — 99310 SBSQ NF CARE HIGH MDM 45: CPT | Performed by: FAMILY MEDICINE

## 2024-11-05 RX ORDER — PROCHLORPERAZINE MALEATE 10 MG
10 TABLET ORAL EVERY 6 HOURS PRN
COMMUNITY
Start: 2024-09-27

## 2024-11-05 RX ORDER — LORATADINE 10 MG/1
1 TABLET ORAL DAILY
COMMUNITY
Start: 2024-08-15

## 2024-11-05 RX ORDER — ALPRAZOLAM 0.25 MG/1
0.25 TABLET ORAL 2 TIMES DAILY PRN
COMMUNITY
Start: 2024-10-22 | End: 2025-04-20

## 2024-11-05 RX ORDER — FAMOTIDINE 20 MG/1
20 TABLET, FILM COATED ORAL 2 TIMES DAILY
COMMUNITY
Start: 2024-09-16 | End: 2025-09-16

## 2024-11-05 RX ORDER — LACTULOSE 10 G/15ML
20 SOLUTION ORAL DAILY
COMMUNITY
Start: 2024-09-26

## 2024-11-05 RX ORDER — MIRTAZAPINE 7.5 MG/1
7.5 TABLET, FILM COATED ORAL
COMMUNITY
Start: 2024-09-26

## 2024-11-05 RX ORDER — SACCHAROMYCES BOULARDII 250 MG
250 CAPSULE ORAL 2 TIMES DAILY
COMMUNITY
Start: 2024-10-30 | End: 2024-11-13

## 2024-11-05 RX ORDER — ALLOPURINOL 100 MG/1
100 TABLET ORAL DAILY
COMMUNITY
Start: 2024-10-26

## 2024-11-05 RX ORDER — FUROSEMIDE 20 MG/1
20 TABLET ORAL DAILY
COMMUNITY
Start: 2024-10-23 | End: 2025-10-23

## 2024-11-05 RX ORDER — FLUTICASONE PROPIONATE 50 MCG
2 SPRAY, SUSPENSION (ML) NASAL 2 TIMES DAILY
COMMUNITY
Start: 2024-08-15

## 2024-11-05 NOTE — ASSESSMENT & PLAN NOTE
CXR on 10/28 reviewed showing persistent right lower lung infiltrate with associated small pleural effusion- recommend repeat imaging in 8 weeks to assess for full resolution  Completed course of antibiotics; no clinical signs of active pneumonia at this time  Completed course of steroids for associated pneumonitis  Wean/titrate O2 as above  Encourage deep breathing/IS  Follow up with pulmonary medicine

## 2024-11-05 NOTE — ASSESSMENT & PLAN NOTE
Continue diltiazem for rate control  Continue anticoagulation with xarelto  CBC, CMP ordered for 11/7   Protonix, Zoloft, Folic Acid, Ativan, MVI, Lispro, Aldactone, Prednisone ,Inderal, Thiamine,Hibiclens.Lasix

## 2024-11-05 NOTE — ASSESSMENT & PLAN NOTE
Stage 4 non small cell lung cancer  Follow up with hematology/oncology as scheduled- review need for allopurinol use with oncology team while undergoing chemotherapy

## 2024-11-05 NOTE — PROGRESS NOTES
[facility]  Progress Note  POS: [id number]    Chief Complaint/Reason for visit: STR follow up  History of Present Illness: 79 year old female evaluated for STR follow up. Daughter present at bedside. See A&P for additional HPI. Most recent labs and current medication list reviewed with patient and daughter.       Review of systems: Review of Systems   Constitutional:  Negative for fever and unexpected weight change.      Medications: No changes made  Consults reviewed:PT, OT, and Speech  Labs/Diagnostics (reviewed by this provider): Copy in Chart  BMP, CBC (10/24/24)  Na 143 K 3.8 BUN 27 Creat 0.61 Glc 87  Hgb 10.0 WBC 8.4 Plt 315    Imaging Reviewed:  CXR (10/28/24) bronchopneumonia of lower right lung with small to moderate right pleural effusion    Physical Exam    Weight: 138.1lb Temp:97.8F BP:112/57 Pulse:96 Resp:16 O2 Sat:97% 2LNC    Physical Exam  Vitals and nursing note reviewed.   Constitutional:       General: She is awake. She is not in acute distress.     Appearance: She is well-groomed. She is not toxic-appearing or diaphoretic.      Interventions: Nasal cannula in place.   HENT:      Head: Normocephalic and atraumatic.      Nose: No rhinorrhea.      Mouth/Throat:      Mouth: Mucous membranes are moist.   Eyes:      General: No scleral icterus.        Right eye: No discharge.         Left eye: No discharge.   Pulmonary:      Effort: Pulmonary effort is normal. No respiratory distress.   Skin:     Coloration: Skin is not jaundiced or pale.   Neurological:      Mental Status: She is alert. Mental status is at baseline.      Cranial Nerves: No dysarthria or facial asymmetry.   Psychiatric:         Attention and Perception: Attention and perception normal.         Mood and Affect: Mood and affect normal.         Behavior: Behavior is cooperative.         Assessment/Plan:  79 year old female with:    Acute hypoxic respiratory failure (HCC)  In setting of pneumonia and  CHF; with underlying COPD- management of each as outlined  New order to wean/titrate oxygen to maintain saturations at 90% or above  Encourage deep breathing/incentive spirometry    Chronic diastolic heart failure (HCC)  Monitor daily weights/CHF pathway; weight stable within 1lb over past 72 hours  Continue lasix  CMP ordered for 11/7 to reassess renal function and electrolytes  Fluid restriction of 2L per day  Routine cardiology follow up    Pneumonia of right lower lobe due to infectious organism  CXR on 10/28 reviewed showing persistent right lower lung infiltrate with associated small pleural effusion- recommend repeat imaging in 8 weeks to assess for full resolution  Completed course of antibiotics; no clinical signs of active pneumonia at this time  Completed course of steroids for associated pneumonitis  Wean/titrate O2 as above  Encourage deep breathing/IS  Follow up with pulmonary medicine     Non-small cell lung cancer with metastasis (HCC)  Stage 4 non small cell lung cancer  Follow up with hematology/oncology as scheduled- review need for allopurinol use with oncology team while undergoing chemotherapy    Goals of care, counseling/discussion  Code status reviewed with patient and changed to do not resuscitate   POLST form updated    Atrial fibrillation (HCC)  Continue diltiazem for rate control  Continue anticoagulation with xarelto  CBC, CMP ordered for 11/7    Physical deconditioning  Continue SNF rehab  Continue therapy services  Supportive care, nutritional support, ADL support  Management of acute and chronic medical conditions as outlined  Fall precautions     Mckayla Ho,   11/5/24

## 2024-11-05 NOTE — ASSESSMENT & PLAN NOTE
In setting of pneumonia and CHF; with underlying COPD- management of each as outlined  New order to wean/titrate oxygen to maintain saturations at 90% or above  Encourage deep breathing/incentive spirometry

## 2024-11-05 NOTE — ASSESSMENT & PLAN NOTE
Continue SNF rehab  Continue therapy services  Supportive care, nutritional support, ADL support  Management of acute and chronic medical conditions as outlined  Fall precautions

## 2024-11-05 NOTE — ASSESSMENT & PLAN NOTE
Monitor daily weights/CHF pathway; weight stable within 1lb over past 72 hours  Continue lasix  CMP ordered for 11/7 to reassess renal function and electrolytes  Fluid restriction of 2L per day  Routine cardiology follow up

## 2024-11-12 ENCOUNTER — NURSING HOME VISIT (OUTPATIENT)
Dept: GERIATRICS | Facility: OTHER | Age: 79
End: 2024-11-12
Payer: MEDICARE

## 2024-11-12 DIAGNOSIS — J18.9 PNEUMONIA OF RIGHT LOWER LOBE DUE TO INFECTIOUS ORGANISM: ICD-10-CM

## 2024-11-12 DIAGNOSIS — Z87.09 H/O PNEUMOTHORAX: ICD-10-CM

## 2024-11-12 DIAGNOSIS — C34.90 NON-SMALL CELL LUNG CANCER WITH METASTASIS (HCC): ICD-10-CM

## 2024-11-12 DIAGNOSIS — R53.81 PHYSICAL DECONDITIONING: ICD-10-CM

## 2024-11-12 DIAGNOSIS — I48.91 ATRIAL FIBRILLATION, UNSPECIFIED TYPE (HCC): ICD-10-CM

## 2024-11-12 DIAGNOSIS — J90 RECURRENT RIGHT PLEURAL EFFUSION: ICD-10-CM

## 2024-11-12 DIAGNOSIS — I50.32 CHRONIC DIASTOLIC HEART FAILURE (HCC): ICD-10-CM

## 2024-11-12 DIAGNOSIS — J96.01 ACUTE HYPOXIC RESPIRATORY FAILURE (HCC): Primary | ICD-10-CM

## 2024-11-12 PROCEDURE — 99316 NF DSCHRG MGMT 30 MIN+: CPT | Performed by: FAMILY MEDICINE

## 2024-11-12 NOTE — PROGRESS NOTES
[place] [id number] [facility] [place] [id number]  [phone]  DISCHARGE SUMMARY  Facility: [facility]  POS: 32: NF- Long Term    NAME: Albina Silveira  AGE: 79 y.o. SEX: female  DATE OF ADMISSION: 10/22/24 DATE OF DISCHARGE:11/13/24 DISCHARGE DISPOSITION: Home with 24/7 care    Reason for admission: Patient was admitted from [facility] for rehabilitation after hospitalization for acute respiratory failure with hypoxia with pneumonia and right pleural effusion (s/p thoracentesis 10/16 with apical pneumothorax).    Admission Diagnoses:   Problem List Items Addressed This Visit          Cardiovascular and Mediastinum    Atrial fibrillation (HCC)    Continue diltiazem for rate control  Continue anticoagulation with xarelto- on hold pending thoracentesis on 11/14         Chronic diastolic heart failure (HCC)    Continue to monitor weight and volume status closely  Weight stable within 1lb over past week  Continue lasix 20mg daily  Fluid restriction of 2L per day  Follow up with cardiology            Respiratory    Non-small cell lung cancer with metastasis (HCC)    Stage 4 non small cell lung cancer  Follow up with hematology/oncology as scheduled for ongoing management         Acute hypoxic respiratory failure (HCC) - Primary    In setting of pneumonia and CHF with recurrent pleural effusion with pending right thoracentesis on 11/14; with underlying COPD- management of each as outlined  Continues to require supplemental oxygen via nasal cannula at 3L to maintain appropriate oxygen saturations with goal sat of 90%; continued monitoring and titration as able outpatient  Encourage deep breathing/incentive spirometry         Pneumonia of right lower lobe due to infectious organism    CXR on 10/28 reviewed showing persistent right lower lung infiltrate with associated small pleural effusion, no evidence of pneumothorax reported- recommend repeat imaging in 8 weeks to assess for full  resolution  Completed course of antibiotics; no clinical signs of active pneumonia at this time  Completed course of steroids for associated pneumonitis  Management of supplemental O2 as above  Encourage deep breathing/IS  Follow up with pulmonary medicine          Recurrent right pleural effusion    Right thoracentesis pending for 11/14/24 as ordered by hematology oncology  Xarelto on hold until procedure complete            Care Coordination    Physical deconditioning    Completed SNF rehab  Would benefit from continued therapy services upon SNF discharge  Supportive care, nutritional support, ADL support  Management of acute and chronic medical conditions as outlined  Fall precautions            Other    H/O pneumothorax     Additional Problems:  -Dry eyes: started on artifical tears as needed  Discharge Diagnoses: Same as above    Course of stay: Patient was admitted to [facility] for rehabilitation due to acute respiratory failure with hypoxia with pneumonia and right pleural effusion (s/p thoracentesis 10/16 with apical pneumothorax). Continues to require supplemental oxygen at 3L with O2 sat of 86% on room air at rest earlier today. Is following closely with hematology oncology for ongoing management of metastatic lung cancer; upcoming right thoracentesis scheduled for 11/14/24 for recurrent pleural effusion. Completed course of antibiotics for pneumonia and steroids for COPD exacerbation. See A&P above for additional details. The patient participated in PT/OT; see therapy and nursing notes for most current functional levels.     Labs and testing performed during stay:  CBC (11/7/24) Hgb 9.8 WBC 6.2 Plt 318  CMP (11/7/24) Na 142 K 3.8 BUN 16 Creat 0.56 Alk phos 162 Tbili 0.4 AST 19 ALT 12 Alb 3.0 Tprot 5.3  TSH (11/7/24) 4.37    CXR (10/28/24) right lower lung infiltrate with effusion    Discharge Medications: See discharge medication list which was reviewed and signed. Reviewed and updated in Epic  to reflect most current SNF orders at discharge.  Of note, Xarelto on hold pending right thoracentesis on Thursday, 11/14.       Current Outpatient Medications:     albuterol (PROVENTIL HFA,VENTOLIN HFA) 90 mcg/act inhaler, Inhale 2 puffs every 4 (four) hours as needed, Disp: , Rfl:     allopurinol (ZYLOPRIM) 100 mg tablet, Take 100 mg by mouth daily, Disp: , Rfl:     ALPRAZolam (XANAX) 0.25 mg tablet, Take 0.25 mg by mouth 2 (two) times a day as needed, Disp: , Rfl:     Artificial Tear Solution (GENTEAL TEARS OP), Administer 1 drop to both eyes daily as needed (dry eyes), Disp: , Rfl:     Calcium Carb-Cholecalciferol (CALCIUM + D3 PO), Take 1 tablet by mouth daily, Disp: , Rfl:     Cholecalciferol (VITAMIN D3) 1,000 units tablet, Take 1,000 Units by mouth daily, Disp: , Rfl:     diltiazem (CARDIZEM CD) 180 mg 24 hr capsule, Take 180 mg by mouth daily, Disp: , Rfl:     famotidine (PEPCID) 20 mg tablet, Take 20 mg by mouth 2 (two) times a day, Disp: , Rfl:     fluticasone (FLONASE) 50 mcg/act nasal spray, 2 sprays into each nostril 2 (two) times a day, Disp: , Rfl:     fluticasone-umeclidinium-vilanterol (Trelegy Ellipta) 100-62.5-25 MCG/INH inhaler, Inhale 1 puff daily, Disp: , Rfl:     furosemide (LASIX) 20 mg tablet, Take 20 mg by mouth daily, Disp: , Rfl:     lactulose (CHRONULAC) 10 g/15 mL solution, Take 20 g by mouth daily, Disp: , Rfl:     levothyroxine 88 mcg tablet, Take 88 mcg by mouth daily, Disp: , Rfl:     loratadine (CLARITIN) 10 mg tablet, Take 1 tablet by mouth in the morning, Disp: , Rfl:     mirtazapine (REMERON) 7.5 MG tablet, Take 7.5 mg by mouth daily at bedtime, Disp: , Rfl:     prochlorperazine (COMPAZINE) 10 mg tablet, Take 10 mg by mouth every 6 (six) hours as needed, Disp: , Rfl:     psyllium (METAMUCIL) 0.52 g capsule, Take 0.52 g by mouth daily as needed for constipation, Disp: , Rfl:     rivaroxaban (XARELTO) 20 mg tablet, Take 20 mg by mouth daily with breakfast (Patient not taking:  Reported on 11/13/2024), Disp: , Rfl:      Status at time of discharge: Stable    Today's Visit: 11/12/24    Subjective: 79 year old female evaluated for follow up and discharge visit; will return home with / care on 11/13/24. Right thoracentesis pending for 11/14, xarelto on hold per orders from hematology oncology on 11/11/24- notes reviewed in [facility] Care Everywhere; chemotherapy on hold x1 week pending thoracentesis. Patient notes constipation and is requesting extra dose of lactulose. Continues to require supplemental oxygen via nasal cannula at 3L; noted to have sat of 86% on room air at rest on 11/12/24.     Exam: Physical Exam  Vitals and nursing note reviewed.   Constitutional:       General: She is awake. She is not in acute distress.     Appearance: She is well-groomed. She is not toxic-appearing or diaphoretic.      Interventions: Nasal cannula in place.   HENT:      Head: Normocephalic and atraumatic.      Nose: No rhinorrhea.      Mouth/Throat:      Mouth: Mucous membranes are moist.   Eyes:      General: No scleral icterus.        Right eye: No discharge.         Left eye: No discharge.      Conjunctiva/sclera: Conjunctivae normal.   Cardiovascular:      Rate and Rhythm: Normal rate.   Pulmonary:      Effort: Pulmonary effort is normal. No respiratory distress.   Skin:     Coloration: Skin is not jaundiced or pale.   Neurological:      Mental Status: She is alert. Mental status is at baseline.   Psychiatric:         Behavior: Behavior is cooperative.       Further instructions:  -Fall precautions  -Aspiration precautions  -Bleeding precautions  -Monitor for signs/symptoms of infection  -Medication list was reviewed and signed  -[facility] orders completed for home equipment    Follow-up Recommendations: Please follow-up with your primary care physician within 7-10 days of discharge to review medication changes and current status. Discharge summary forwarded to PCP through Epic.     Problem List Follow-up  Recommendations:  -See above    I have spent 35 minutes today in which greater than 50% of this time was spent in counseling/coordination of care regarding: patient evaluation; medication review, reconciliation and ordered; review of lab and imaging results; review of medical records and nursing notes including nursing notes in SNF EMR and specialist consult notes in [facility] Care Everywhere; completion of discharge paperwork; coordination of discharge care with social work and nursing.  Mckayla Ho,   11/12/24

## 2024-11-13 PROBLEM — J90 RECURRENT RIGHT PLEURAL EFFUSION: Status: ACTIVE | Noted: 2024-11-13

## 2024-11-13 NOTE — ASSESSMENT & PLAN NOTE
Completed SNF rehab  Would benefit from continued therapy services upon SNF discharge  Supportive care, nutritional support, ADL support  Management of acute and chronic medical conditions as outlined  Fall precautions

## 2024-11-13 NOTE — ASSESSMENT & PLAN NOTE
Continue diltiazem for rate control  Continue anticoagulation with xarelto- on hold pending thoracentesis on 11/14

## 2024-11-13 NOTE — ASSESSMENT & PLAN NOTE
Stage 4 non small cell lung cancer  Follow up with hematology/oncology as scheduled for ongoing management

## 2024-11-13 NOTE — ASSESSMENT & PLAN NOTE
Continue to monitor weight and volume status closely  Weight stable within 1lb over past week  Continue lasix 20mg daily  Fluid restriction of 2L per day  Follow up with cardiology

## 2024-11-13 NOTE — ASSESSMENT & PLAN NOTE
CXR on 10/28 reviewed showing persistent right lower lung infiltrate with associated small pleural effusion, no evidence of pneumothorax reported- recommend repeat imaging in 8 weeks to assess for full resolution  Completed course of antibiotics; no clinical signs of active pneumonia at this time  Completed course of steroids for associated pneumonitis  Management of supplemental O2 as above  Encourage deep breathing/IS  Follow up with pulmonary medicine

## 2024-11-13 NOTE — ASSESSMENT & PLAN NOTE
Right thoracentesis pending for 11/14/24 as ordered by hematology oncology  Xarelto on hold until procedure complete

## 2024-11-13 NOTE — ASSESSMENT & PLAN NOTE
In setting of pneumonia and CHF with recurrent pleural effusion with pending right thoracentesis on 11/14; with underlying COPD- management of each as outlined  Continues to require supplemental oxygen via nasal cannula at 3L to maintain appropriate oxygen saturations with goal sat of 90%; continued monitoring and titration as able outpatient  Encourage deep breathing/incentive spirometry

## (undated) DEVICE — ENDOPOUCH RETRIEVER SPECIMEN RETRIEVAL BAGS: Brand: ENDOPOUCH RETRIEVER

## (undated) DEVICE — LIGAMAX 5 MM ENDOSCOPIC MULTIPLE CLIP APPLIER: Brand: LIGAMAX

## (undated) DEVICE — B-H IRRIGATING CAN 19GA FLAT ANGLED 8MM: Brand: OPHTHALMIC CANNULA

## (undated) DEVICE — MICROSURGICAL INSTRUMENT IRR. CYSTITOME 25GA STRAIGHT-REVERSE CUTTING: Brand: ALCON

## (undated) DEVICE — EYE PACK CUSTOM -FINNEGAN

## (undated) DEVICE — AIR INJECT CANNULA 27GA: Brand: OPHTHALMIC CANNULA

## (undated) DEVICE — JP CHAN DRN SIL HUBLESS 15FR W/TRO: Brand: CARDINAL HEALTH

## (undated) DEVICE — INTREPID® TRANSFORMER IA HP: Brand: INTREPID®

## (undated) DEVICE — GLOVE SRG BIOGEL 7.5

## (undated) DEVICE — CLEARCUT® SLIT KNIFE INTREPID MICRO-COAXIAL SYSTEM 2.4 SB: Brand: CLEARCUT®; INTREPID

## (undated) DEVICE — JACKSON-PRATT 100CC BULB RESERVOIR: Brand: CARDINAL HEALTH

## (undated) DEVICE — ACTIVE FMS W/ INTREPID* ULTRA SLEEVES, 0.9MM 45° ABS* INTREPID* BALANCED TIP: Brand: ALCON

## (undated) DEVICE — THE MONARCH® "D" CARTRIDGE IS A SINGLE-USE POLYPROPYLENE CARTRIDGE FOR POSTERIOR CHAMBER IOL DELIVERY: Brand: MONARCH® III

## (undated) DEVICE — ENSEAL LAPAROSCOPIC TISSUE SEALER G2 CURVED JAW FOR USE WITH G2 GENERATOR 5MM DIAMETER 35CM SHAFT LENGTH: Brand: ENSEAL

## (undated) DEVICE — DRAIN SPONGES,6 PLY: Brand: EXCILON

## (undated) DEVICE — ADHESIVE SKN CLSR HISTOACRYL FLEX 0.5ML LF